# Patient Record
Sex: FEMALE | Race: WHITE | Employment: UNEMPLOYED | URBAN - METROPOLITAN AREA
[De-identification: names, ages, dates, MRNs, and addresses within clinical notes are randomized per-mention and may not be internally consistent; named-entity substitution may affect disease eponyms.]

---

## 2017-01-06 ENCOUNTER — TELEPHONE (OUTPATIENT)
Dept: PHARMACY | Facility: CLINIC | Age: 72
End: 2017-01-06

## 2017-03-08 PROBLEM — K52.9 COLITIS: Status: ACTIVE | Noted: 2017-03-08

## 2017-03-08 PROBLEM — M47.817 LUMBOSACRAL SPONDYLOSIS: Status: ACTIVE | Noted: 2017-03-08

## 2017-03-14 PROBLEM — M70.71 BURSITIS OF RIGHT HIP: Status: ACTIVE | Noted: 2017-03-14

## 2017-03-14 PROBLEM — M17.12 PRIMARY OSTEOARTHRITIS OF LEFT KNEE: Status: ACTIVE | Noted: 2017-03-14

## 2017-05-05 PROBLEM — R41.3 MEMORY IMPAIRMENT: Status: ACTIVE | Noted: 2017-05-05

## 2017-12-11 PROBLEM — S22.000A CLOSED COMPRESSION FRACTURE OF THORACIC VERTEBRA (HCC): Status: ACTIVE | Noted: 2017-01-01

## 2017-12-11 PROBLEM — M54.9 INTRACTABLE BACK PAIN: Status: ACTIVE | Noted: 2017-01-01

## 2017-12-11 PROBLEM — S32.040A CLOSED COMPRESSION FRACTURE OF FOURTH LUMBAR VERTEBRA (HCC): Status: ACTIVE | Noted: 2017-01-01

## 2017-12-11 PROBLEM — M54.50 ACUTE MIDLINE LOW BACK PAIN WITHOUT SCIATICA: Status: ACTIVE | Noted: 2017-01-01

## 2018-01-01 ENCOUNTER — CARE COORDINATION (OUTPATIENT)
Dept: CARE COORDINATION | Age: 73
End: 2018-01-01

## 2018-01-01 ENCOUNTER — APPOINTMENT (OUTPATIENT)
Dept: GENERAL RADIOLOGY | Age: 73
DRG: 698 | End: 2018-01-01
Payer: MEDICARE

## 2018-01-01 ENCOUNTER — APPOINTMENT (OUTPATIENT)
Dept: GENERAL RADIOLOGY | Age: 73
DRG: 871 | End: 2018-01-01
Payer: MEDICARE

## 2018-01-01 ENCOUNTER — TELEPHONE (OUTPATIENT)
Dept: PHARMACY | Facility: CLINIC | Age: 73
End: 2018-01-01

## 2018-01-01 ENCOUNTER — HOSPITAL ENCOUNTER (INPATIENT)
Age: 73
LOS: 4 days | Discharge: NURSING, REHAB OR LONG-TERM CARE | DRG: 193 | End: 2018-07-09
Attending: EMERGENCY MEDICINE | Admitting: INTERNAL MEDICINE
Payer: MEDICARE

## 2018-01-01 ENCOUNTER — APPOINTMENT (OUTPATIENT)
Dept: CT IMAGING | Age: 73
DRG: 193 | End: 2018-01-01
Payer: MEDICARE

## 2018-01-01 ENCOUNTER — HOSPITAL ENCOUNTER (INPATIENT)
Age: 73
LOS: 3 days | Discharge: HOME | DRG: 698 | End: 2018-03-22
Attending: EMERGENCY MEDICINE | Admitting: INTERNAL MEDICINE
Payer: MEDICARE

## 2018-01-01 ENCOUNTER — HOSPITAL ENCOUNTER (INPATIENT)
Age: 73
LOS: 2 days | Discharge: HOME | DRG: 698 | End: 2018-04-12
Attending: EMERGENCY MEDICINE | Admitting: INTERNAL MEDICINE
Payer: MEDICARE

## 2018-01-01 ENCOUNTER — APPOINTMENT (OUTPATIENT)
Dept: CT IMAGING | Age: 73
DRG: 698 | End: 2018-01-01
Payer: MEDICARE

## 2018-01-01 ENCOUNTER — APPOINTMENT (OUTPATIENT)
Dept: ULTRASOUND IMAGING | Age: 73
DRG: 698 | End: 2018-01-01
Payer: MEDICARE

## 2018-01-01 ENCOUNTER — APPOINTMENT (OUTPATIENT)
Dept: GENERAL RADIOLOGY | Age: 73
DRG: 193 | End: 2018-01-01
Payer: MEDICARE

## 2018-01-01 ENCOUNTER — APPOINTMENT (OUTPATIENT)
Dept: CT IMAGING | Age: 73
DRG: 871 | End: 2018-01-01
Payer: MEDICARE

## 2018-01-01 ENCOUNTER — HOSPITAL ENCOUNTER (INPATIENT)
Age: 73
LOS: 1 days | DRG: 871 | End: 2018-08-18
Attending: EMERGENCY MEDICINE | Admitting: INTERNAL MEDICINE
Payer: MEDICARE

## 2018-01-01 ENCOUNTER — HOSPITAL ENCOUNTER (INPATIENT)
Age: 73
LOS: 3 days | Discharge: NURSING, REHAB OR LONG-TERM CARE | DRG: 698 | End: 2018-07-26
Attending: EMERGENCY MEDICINE | Admitting: INTERNAL MEDICINE
Payer: MEDICARE

## 2018-01-01 VITALS
WEIGHT: 117 LBS | SYSTOLIC BLOOD PRESSURE: 158 MMHG | OXYGEN SATURATION: 96 % | HEART RATE: 64 BPM | BODY MASS INDEX: 24.56 KG/M2 | HEIGHT: 58 IN | DIASTOLIC BLOOD PRESSURE: 80 MMHG | TEMPERATURE: 98.1 F | RESPIRATION RATE: 18 BRPM

## 2018-01-01 VITALS
OXYGEN SATURATION: 96 % | WEIGHT: 132.3 LBS | HEART RATE: 68 BPM | TEMPERATURE: 98.2 F | RESPIRATION RATE: 18 BRPM | HEIGHT: 60 IN | SYSTOLIC BLOOD PRESSURE: 126 MMHG | BODY MASS INDEX: 25.97 KG/M2 | DIASTOLIC BLOOD PRESSURE: 84 MMHG

## 2018-01-01 VITALS
HEIGHT: 60 IN | HEART RATE: 70 BPM | WEIGHT: 125 LBS | BODY MASS INDEX: 24.54 KG/M2 | RESPIRATION RATE: 16 BRPM | OXYGEN SATURATION: 96 % | DIASTOLIC BLOOD PRESSURE: 72 MMHG | SYSTOLIC BLOOD PRESSURE: 138 MMHG | TEMPERATURE: 97.6 F

## 2018-01-01 VITALS
BODY MASS INDEX: 22.28 KG/M2 | RESPIRATION RATE: 18 BRPM | SYSTOLIC BLOOD PRESSURE: 106 MMHG | OXYGEN SATURATION: 94 % | TEMPERATURE: 98 F | DIASTOLIC BLOOD PRESSURE: 60 MMHG | HEIGHT: 60 IN | HEART RATE: 60 BPM | WEIGHT: 113.5 LBS

## 2018-01-01 VITALS
BODY MASS INDEX: 22.17 KG/M2 | DIASTOLIC BLOOD PRESSURE: 58 MMHG | TEMPERATURE: 96.6 F | SYSTOLIC BLOOD PRESSURE: 98 MMHG | WEIGHT: 113.54 LBS | OXYGEN SATURATION: 97 %

## 2018-01-01 DIAGNOSIS — G20 PARKINSON DISEASE (HCC): Primary | ICD-10-CM

## 2018-01-01 DIAGNOSIS — A41.9 SEPSIS, DUE TO UNSPECIFIED ORGANISM: ICD-10-CM

## 2018-01-01 DIAGNOSIS — N39.0 URINARY TRACT INFECTION WITH HEMATURIA, SITE UNSPECIFIED: ICD-10-CM

## 2018-01-01 DIAGNOSIS — A41.9 SEPSIS, DUE TO UNSPECIFIED ORGANISM: Primary | ICD-10-CM

## 2018-01-01 DIAGNOSIS — J96.90 RESPIRATORY FAILURE, UNSPECIFIED CHRONICITY, UNSPECIFIED WHETHER WITH HYPOXIA OR HYPERCAPNIA (HCC): ICD-10-CM

## 2018-01-01 DIAGNOSIS — R41.82 ALTERED MENTAL STATUS, UNSPECIFIED ALTERED MENTAL STATUS TYPE: Primary | ICD-10-CM

## 2018-01-01 DIAGNOSIS — S22.000A CLOSED COMPRESSION FRACTURE OF THORACIC VERTEBRA, INITIAL ENCOUNTER (HCC): Primary | ICD-10-CM

## 2018-01-01 DIAGNOSIS — M15.9 GENERALIZED OSTEOARTHRITIS: ICD-10-CM

## 2018-01-01 DIAGNOSIS — R31.9 URINARY TRACT INFECTION WITH HEMATURIA, SITE UNSPECIFIED: ICD-10-CM

## 2018-01-01 DIAGNOSIS — E87.20 LACTIC ACIDOSIS: ICD-10-CM

## 2018-01-01 DIAGNOSIS — N39.0 URINARY TRACT INFECTION ASSOCIATED WITH INDWELLING URETHRAL CATHETER, INITIAL ENCOUNTER (HCC): ICD-10-CM

## 2018-01-01 DIAGNOSIS — R19.8 PERFORATED VISCUS: ICD-10-CM

## 2018-01-01 DIAGNOSIS — R06.03 RESPIRATORY DISTRESS: Primary | ICD-10-CM

## 2018-01-01 DIAGNOSIS — T83.511A URINARY TRACT INFECTION ASSOCIATED WITH INDWELLING URETHRAL CATHETER, INITIAL ENCOUNTER (HCC): ICD-10-CM

## 2018-01-01 LAB
AADO2: 343.9 MMHG
ALBUMIN SERPL-MCNC: 2.9 G/DL (ref 3.5–5.2)
ALBUMIN SERPL-MCNC: 3.1 G/DL (ref 3.5–5.2)
ALBUMIN SERPL-MCNC: 3.1 G/DL (ref 3.5–5.2)
ALBUMIN SERPL-MCNC: 3.2 G/DL (ref 3.5–5.2)
ALBUMIN SERPL-MCNC: 3.2 G/DL (ref 3.5–5.2)
ALBUMIN SERPL-MCNC: 3.3 G/DL (ref 3.5–5.2)
ALBUMIN SERPL-MCNC: 3.4 G/DL (ref 3.5–5.2)
ALBUMIN SERPL-MCNC: 3.5 G/DL (ref 3.5–5.2)
ALBUMIN SERPL-MCNC: 4 G/DL (ref 3.5–5.2)
ALP BLD-CCNC: 102 U/L (ref 35–104)
ALP BLD-CCNC: 103 U/L (ref 35–104)
ALP BLD-CCNC: 134 U/L (ref 35–104)
ALP BLD-CCNC: 141 U/L (ref 35–104)
ALP BLD-CCNC: 143 U/L (ref 35–104)
ALP BLD-CCNC: 144 U/L (ref 35–104)
ALP BLD-CCNC: 145 U/L (ref 35–104)
ALP BLD-CCNC: 65 U/L (ref 35–104)
ALP BLD-CCNC: 73 U/L (ref 35–104)
ALP BLD-CCNC: 77 U/L (ref 35–104)
ALP BLD-CCNC: 79 U/L (ref 35–104)
ALP BLD-CCNC: 79 U/L (ref 35–104)
ALP BLD-CCNC: 81 U/L (ref 35–104)
ALP BLD-CCNC: 90 U/L (ref 35–104)
ALP BLD-CCNC: 93 U/L (ref 35–104)
ALP BLD-CCNC: 94 U/L (ref 35–104)
ALT SERPL-CCNC: 10 U/L (ref 0–32)
ALT SERPL-CCNC: 11 U/L (ref 0–32)
ALT SERPL-CCNC: 15 U/L (ref 0–32)
ALT SERPL-CCNC: 17 U/L (ref 0–32)
ALT SERPL-CCNC: 20 U/L (ref 0–32)
ALT SERPL-CCNC: 23 U/L (ref 0–32)
ALT SERPL-CCNC: 5 U/L (ref 0–32)
ALT SERPL-CCNC: 5 U/L (ref 0–32)
ALT SERPL-CCNC: 6 U/L (ref 0–32)
ALT SERPL-CCNC: 6 U/L (ref 0–32)
ALT SERPL-CCNC: 7 U/L (ref 0–32)
ALT SERPL-CCNC: 9 U/L (ref 0–32)
ALT SERPL-CCNC: <5 U/L (ref 0–32)
AMORPHOUS: ABNORMAL
ANION GAP SERPL CALCULATED.3IONS-SCNC: 10 MMOL/L (ref 7–16)
ANION GAP SERPL CALCULATED.3IONS-SCNC: 11 MMOL/L (ref 7–16)
ANION GAP SERPL CALCULATED.3IONS-SCNC: 13 MMOL/L (ref 7–16)
ANION GAP SERPL CALCULATED.3IONS-SCNC: 13 MMOL/L (ref 7–16)
ANION GAP SERPL CALCULATED.3IONS-SCNC: 14 MMOL/L (ref 7–16)
ANION GAP SERPL CALCULATED.3IONS-SCNC: 14 MMOL/L (ref 7–16)
ANION GAP SERPL CALCULATED.3IONS-SCNC: 15 MMOL/L (ref 7–16)
ANION GAP SERPL CALCULATED.3IONS-SCNC: 17 MMOL/L (ref 7–16)
ANION GAP SERPL CALCULATED.3IONS-SCNC: 17 MMOL/L (ref 7–16)
ANION GAP SERPL CALCULATED.3IONS-SCNC: 18 MMOL/L (ref 7–16)
ANION GAP SERPL CALCULATED.3IONS-SCNC: 20 MMOL/L (ref 7–16)
ANION GAP SERPL CALCULATED.3IONS-SCNC: 24 MMOL/L (ref 7–16)
ANION GAP SERPL CALCULATED.3IONS-SCNC: 25 MMOL/L (ref 7–16)
ANISOCYTOSIS: ABNORMAL
APTT: 27.7 SEC (ref 24.5–35.1)
AST SERPL-CCNC: 12 U/L (ref 0–31)
AST SERPL-CCNC: 15 U/L (ref 0–31)
AST SERPL-CCNC: 16 U/L (ref 0–31)
AST SERPL-CCNC: 16 U/L (ref 0–31)
AST SERPL-CCNC: 17 U/L (ref 0–31)
AST SERPL-CCNC: 18 U/L (ref 0–31)
AST SERPL-CCNC: 19 U/L (ref 0–31)
AST SERPL-CCNC: 20 U/L (ref 0–31)
AST SERPL-CCNC: 25 U/L (ref 0–31)
AST SERPL-CCNC: 29 U/L (ref 0–31)
AST SERPL-CCNC: 30 U/L (ref 0–31)
AST SERPL-CCNC: 30 U/L (ref 0–31)
AST SERPL-CCNC: 39 U/L (ref 0–31)
AST SERPL-CCNC: 41 U/L (ref 0–31)
AST SERPL-CCNC: 46 U/L (ref 0–31)
AST SERPL-CCNC: 69 U/L (ref 0–31)
B.E.: -11.9 MMOL/L (ref -3–3)
B.E.: -4.3 MMOL/L (ref -3–3)
BACTERIA: ABNORMAL /HPF
BASOPHILS ABSOLUTE: 0 E9/L (ref 0–0.2)
BASOPHILS ABSOLUTE: 0.02 E9/L (ref 0–0.2)
BASOPHILS ABSOLUTE: 0.03 E9/L (ref 0–0.2)
BASOPHILS ABSOLUTE: 0.04 E9/L (ref 0–0.2)
BASOPHILS ABSOLUTE: 0.04 E9/L (ref 0–0.2)
BASOPHILS ABSOLUTE: 0.05 E9/L (ref 0–0.2)
BASOPHILS ABSOLUTE: 0.05 E9/L (ref 0–0.2)
BASOPHILS ABSOLUTE: 0.06 E9/L (ref 0–0.2)
BASOPHILS ABSOLUTE: 0.09 E9/L (ref 0–0.2)
BASOPHILS RELATIVE PERCENT: 0.2 % (ref 0–2)
BASOPHILS RELATIVE PERCENT: 0.3 % (ref 0–2)
BASOPHILS RELATIVE PERCENT: 0.4 % (ref 0–2)
BASOPHILS RELATIVE PERCENT: 0.4 % (ref 0–2)
BASOPHILS RELATIVE PERCENT: 0.5 % (ref 0–2)
BASOPHILS RELATIVE PERCENT: 0.7 % (ref 0–2)
BILIRUB SERPL-MCNC: 0.2 MG/DL (ref 0–1.2)
BILIRUB SERPL-MCNC: 0.3 MG/DL (ref 0–1.2)
BILIRUB SERPL-MCNC: 0.4 MG/DL (ref 0–1.2)
BILIRUB SERPL-MCNC: 0.5 MG/DL (ref 0–1.2)
BILIRUB SERPL-MCNC: 0.6 MG/DL (ref 0–1.2)
BILIRUB SERPL-MCNC: 0.6 MG/DL (ref 0–1.2)
BILIRUB SERPL-MCNC: 0.9 MG/DL (ref 0–1.2)
BILIRUB SERPL-MCNC: 1.5 MG/DL (ref 0–1.2)
BILIRUBIN URINE: NEGATIVE
BLOOD CULTURE, ROUTINE: ABNORMAL
BLOOD CULTURE, ROUTINE: NORMAL
BLOOD, URINE: ABNORMAL
BUN BLDV-MCNC: 11 MG/DL (ref 8–23)
BUN BLDV-MCNC: 13 MG/DL (ref 8–23)
BUN BLDV-MCNC: 14 MG/DL (ref 8–23)
BUN BLDV-MCNC: 15 MG/DL (ref 8–23)
BUN BLDV-MCNC: 15 MG/DL (ref 8–23)
BUN BLDV-MCNC: 17 MG/DL (ref 8–23)
BUN BLDV-MCNC: 17 MG/DL (ref 8–23)
BUN BLDV-MCNC: 18 MG/DL (ref 8–23)
BUN BLDV-MCNC: 19 MG/DL (ref 8–23)
BUN BLDV-MCNC: 20 MG/DL (ref 8–23)
BUN BLDV-MCNC: 20 MG/DL (ref 8–23)
BUN BLDV-MCNC: 22 MG/DL (ref 8–23)
BUN BLDV-MCNC: 22 MG/DL (ref 8–23)
BUN BLDV-MCNC: 23 MG/DL (ref 8–23)
BUN BLDV-MCNC: 24 MG/DL (ref 8–23)
BUN BLDV-MCNC: 29 MG/DL (ref 8–23)
C-REACTIVE PROTEIN: 4.6 MG/DL (ref 0–0.4)
CALCIUM SERPL-MCNC: 10 MG/DL (ref 8.6–10.2)
CALCIUM SERPL-MCNC: 10.1 MG/DL (ref 8.6–10.2)
CALCIUM SERPL-MCNC: 10.3 MG/DL (ref 8.6–10.2)
CALCIUM SERPL-MCNC: 10.4 MG/DL (ref 8.6–10.2)
CALCIUM SERPL-MCNC: 10.8 MG/DL (ref 8.6–10.2)
CALCIUM SERPL-MCNC: 8.5 MG/DL (ref 8.6–10.2)
CALCIUM SERPL-MCNC: 8.9 MG/DL (ref 8.6–10.2)
CALCIUM SERPL-MCNC: 9.1 MG/DL (ref 8.6–10.2)
CALCIUM SERPL-MCNC: 9.1 MG/DL (ref 8.6–10.2)
CALCIUM SERPL-MCNC: 9.3 MG/DL (ref 8.6–10.2)
CALCIUM SERPL-MCNC: 9.4 MG/DL (ref 8.6–10.2)
CALCIUM SERPL-MCNC: 9.4 MG/DL (ref 8.6–10.2)
CALCIUM SERPL-MCNC: 9.5 MG/DL (ref 8.6–10.2)
CALCIUM SERPL-MCNC: 9.6 MG/DL (ref 8.6–10.2)
CALCIUM SERPL-MCNC: 9.7 MG/DL (ref 8.6–10.2)
CALCIUM SERPL-MCNC: 9.8 MG/DL (ref 8.6–10.2)
CHLORIDE BLD-SCNC: 101 MMOL/L (ref 98–107)
CHLORIDE BLD-SCNC: 102 MMOL/L (ref 98–107)
CHLORIDE BLD-SCNC: 104 MMOL/L (ref 98–107)
CHLORIDE BLD-SCNC: 105 MMOL/L (ref 98–107)
CHLORIDE BLD-SCNC: 106 MMOL/L (ref 98–107)
CHLORIDE BLD-SCNC: 107 MMOL/L (ref 98–107)
CHLORIDE BLD-SCNC: 107 MMOL/L (ref 98–107)
CHLORIDE BLD-SCNC: 93 MMOL/L (ref 98–107)
CHLORIDE BLD-SCNC: 99 MMOL/L (ref 98–107)
CLARITY: ABNORMAL
CLARITY: CLEAR
CLARITY: CLEAR
CO2: 17 MMOL/L (ref 22–29)
CO2: 18 MMOL/L (ref 22–29)
CO2: 18 MMOL/L (ref 22–29)
CO2: 19 MMOL/L (ref 22–29)
CO2: 19 MMOL/L (ref 22–29)
CO2: 21 MMOL/L (ref 22–29)
CO2: 22 MMOL/L (ref 22–29)
CO2: 22 MMOL/L (ref 22–29)
CO2: 23 MMOL/L (ref 22–29)
CO2: 25 MMOL/L (ref 22–29)
CO2: 26 MMOL/L (ref 22–29)
CO2: 26 MMOL/L (ref 22–29)
CO2: 27 MMOL/L (ref 22–29)
CO2: 27 MMOL/L (ref 22–29)
COHB: 0.3 % (ref 0–1.5)
COHB: 0.7 % (ref 0–1.5)
COLOR: ABNORMAL
COLOR: ABNORMAL
COLOR: YELLOW
CREAT SERPL-MCNC: 0.6 MG/DL (ref 0.5–1)
CREAT SERPL-MCNC: 0.7 MG/DL (ref 0.5–1)
CREAT SERPL-MCNC: 0.8 MG/DL (ref 0.5–1)
CRITICAL: ABNORMAL
CRITICAL: ABNORMAL
CRYSTALS, UA: ABNORMAL
CULTURE, BLOOD 2: NORMAL
D DIMER: 2420 NG/ML DDU
DATE ANALYZED: ABNORMAL
DATE ANALYZED: ABNORMAL
DATE OF COLLECTION: ABNORMAL
DATE OF COLLECTION: ABNORMAL
EKG ATRIAL RATE: 107 BPM
EKG ATRIAL RATE: 109 BPM
EKG ATRIAL RATE: 115 BPM
EKG ATRIAL RATE: 122 BPM
EKG ATRIAL RATE: 84 BPM
EKG P AXIS: -4 DEGREES
EKG P AXIS: 10 DEGREES
EKG P AXIS: 12 DEGREES
EKG P AXIS: 39 DEGREES
EKG P AXIS: 5 DEGREES
EKG P-R INTERVAL: 118 MS
EKG P-R INTERVAL: 124 MS
EKG P-R INTERVAL: 126 MS
EKG P-R INTERVAL: 136 MS
EKG P-R INTERVAL: 196 MS
EKG Q-T INTERVAL: 232 MS
EKG Q-T INTERVAL: 326 MS
EKG Q-T INTERVAL: 344 MS
EKG Q-T INTERVAL: 350 MS
EKG Q-T INTERVAL: 356 MS
EKG QRS DURATION: 72 MS
EKG QRS DURATION: 76 MS
EKG QRS DURATION: 82 MS
EKG QTC CALCULATION (BAZETT): 330 MS
EKG QTC CALCULATION (BAZETT): 420 MS
EKG QTC CALCULATION (BAZETT): 439 MS
EKG QTC CALCULATION (BAZETT): 459 MS
EKG QTC CALCULATION (BAZETT): 484 MS
EKG R AXIS: -2 DEGREES
EKG R AXIS: -8 DEGREES
EKG R AXIS: -9 DEGREES
EKG R AXIS: 5 DEGREES
EKG R AXIS: 9 DEGREES
EKG T AXIS: 106 DEGREES
EKG T AXIS: 108 DEGREES
EKG T AXIS: 132 DEGREES
EKG T AXIS: 139 DEGREES
EKG T AXIS: 44 DEGREES
EKG VENTRICULAR RATE: 107 BPM
EKG VENTRICULAR RATE: 109 BPM
EKG VENTRICULAR RATE: 115 BPM
EKG VENTRICULAR RATE: 122 BPM
EKG VENTRICULAR RATE: 84 BPM
EOSINOPHILS ABSOLUTE: 0 E9/L (ref 0.05–0.5)
EOSINOPHILS ABSOLUTE: 0 E9/L (ref 0.05–0.5)
EOSINOPHILS ABSOLUTE: 0.01 E9/L (ref 0.05–0.5)
EOSINOPHILS ABSOLUTE: 0.02 E9/L (ref 0.05–0.5)
EOSINOPHILS ABSOLUTE: 0.02 E9/L (ref 0.05–0.5)
EOSINOPHILS ABSOLUTE: 0.03 E9/L (ref 0.05–0.5)
EOSINOPHILS ABSOLUTE: 0.04 E9/L (ref 0.05–0.5)
EOSINOPHILS ABSOLUTE: 0.07 E9/L (ref 0.05–0.5)
EOSINOPHILS ABSOLUTE: 0.08 E9/L (ref 0.05–0.5)
EOSINOPHILS ABSOLUTE: 0.08 E9/L (ref 0.05–0.5)
EOSINOPHILS ABSOLUTE: 0.09 E9/L (ref 0.05–0.5)
EOSINOPHILS ABSOLUTE: 0.09 E9/L (ref 0.05–0.5)
EOSINOPHILS ABSOLUTE: 0.12 E9/L (ref 0.05–0.5)
EOSINOPHILS ABSOLUTE: 0.12 E9/L (ref 0.05–0.5)
EOSINOPHILS ABSOLUTE: 0.13 E9/L (ref 0.05–0.5)
EOSINOPHILS ABSOLUTE: 0.14 E9/L (ref 0.05–0.5)
EOSINOPHILS ABSOLUTE: 0.19 E9/L (ref 0.05–0.5)
EOSINOPHILS RELATIVE PERCENT: 0 % (ref 0–6)
EOSINOPHILS RELATIVE PERCENT: 0 % (ref 0–6)
EOSINOPHILS RELATIVE PERCENT: 0.1 % (ref 0–6)
EOSINOPHILS RELATIVE PERCENT: 0.2 % (ref 0–6)
EOSINOPHILS RELATIVE PERCENT: 0.4 % (ref 0–6)
EOSINOPHILS RELATIVE PERCENT: 0.8 % (ref 0–6)
EOSINOPHILS RELATIVE PERCENT: 0.8 % (ref 0–6)
EOSINOPHILS RELATIVE PERCENT: 0.9 % (ref 0–6)
EOSINOPHILS RELATIVE PERCENT: 1.2 % (ref 0–6)
EOSINOPHILS RELATIVE PERCENT: 1.2 % (ref 0–6)
EOSINOPHILS RELATIVE PERCENT: 1.6 % (ref 0–6)
EOSINOPHILS RELATIVE PERCENT: 1.7 % (ref 0–6)
EPITHELIAL CELLS, UA: ABNORMAL /HPF
EPITHELIAL CELLS, UA: ABNORMAL /HPF
FILM ARRAY ADENOVIRUS: ABNORMAL
FILM ARRAY BORDETELLA PERTUSSIS: ABNORMAL
FILM ARRAY CHLAMYDOPHILIA PNEUMONIAE: ABNORMAL
FILM ARRAY CORONAVIRUS 229E: ABNORMAL
FILM ARRAY CORONAVIRUS HKU1: ABNORMAL
FILM ARRAY CORONAVIRUS NL63: ABNORMAL
FILM ARRAY CORONAVIRUS OC43: ABNORMAL
FILM ARRAY INFLUENZA A VIRUS 09H1: ABNORMAL
FILM ARRAY INFLUENZA A VIRUS H1: ABNORMAL
FILM ARRAY INFLUENZA A VIRUS H3: ABNORMAL
FILM ARRAY INFLUENZA A VIRUS: ABNORMAL
FILM ARRAY INFLUENZA B: ABNORMAL
FILM ARRAY METAPNEUMOVIRUS: ABNORMAL
FILM ARRAY MYCOPLASMA PNEUMONIAE: ABNORMAL
FILM ARRAY PARAINFLUENZA VIRUS 1: ABNORMAL
FILM ARRAY PARAINFLUENZA VIRUS 2: ABNORMAL
FILM ARRAY PARAINFLUENZA VIRUS 3: ABNORMAL
FILM ARRAY PARAINFLUENZA VIRUS 4: ABNORMAL
FILM ARRAY RESPIRATORY SYNCITIAL VIRUS: ABNORMAL
FIO2: 100 %
GFR AFRICAN AMERICAN: 33
GFR AFRICAN AMERICAN: >60
GFR NON-AFRICAN AMERICAN: 27 ML/MIN/1.73
GFR NON-AFRICAN AMERICAN: >60 ML/MIN/1.73
GLUCOSE BLD-MCNC: 102 MG/DL (ref 74–109)
GLUCOSE BLD-MCNC: 113 MG/DL (ref 74–109)
GLUCOSE BLD-MCNC: 120 MG/DL (ref 74–109)
GLUCOSE BLD-MCNC: 129 MG/DL (ref 74–109)
GLUCOSE BLD-MCNC: 136 MG/DL (ref 74–109)
GLUCOSE BLD-MCNC: 138 MG/DL (ref 74–109)
GLUCOSE BLD-MCNC: 163 MG/DL (ref 74–109)
GLUCOSE BLD-MCNC: 77 MG/DL (ref 74–109)
GLUCOSE BLD-MCNC: 79 MG/DL (ref 74–109)
GLUCOSE BLD-MCNC: 82 MG/DL (ref 74–109)
GLUCOSE BLD-MCNC: 83 MG/DL (ref 74–109)
GLUCOSE BLD-MCNC: 91 MG/DL (ref 74–109)
GLUCOSE BLD-MCNC: 93 MG/DL (ref 74–109)
GLUCOSE BLD-MCNC: 94 MG/DL (ref 74–109)
GLUCOSE BLD-MCNC: 95 MG/DL (ref 74–109)
GLUCOSE BLD-MCNC: 98 MG/DL (ref 74–109)
GLUCOSE BLD-MCNC: 98 MG/DL (ref 74–109)
GLUCOSE URINE: NEGATIVE MG/DL
HCO3: 12 MMOL/L (ref 22–26)
HCO3: 18.9 MMOL/L (ref 22–26)
HCT VFR BLD CALC: 34.2 % (ref 34–48)
HCT VFR BLD CALC: 34.7 % (ref 34–48)
HCT VFR BLD CALC: 34.8 % (ref 34–48)
HCT VFR BLD CALC: 35.8 % (ref 34–48)
HCT VFR BLD CALC: 36.2 % (ref 34–48)
HCT VFR BLD CALC: 36.2 % (ref 34–48)
HCT VFR BLD CALC: 36.7 % (ref 34–48)
HCT VFR BLD CALC: 36.7 % (ref 34–48)
HCT VFR BLD CALC: 37.5 % (ref 34–48)
HCT VFR BLD CALC: 37.9 % (ref 34–48)
HCT VFR BLD CALC: 38.2 % (ref 34–48)
HCT VFR BLD CALC: 38.6 % (ref 34–48)
HCT VFR BLD CALC: 39.3 % (ref 34–48)
HCT VFR BLD CALC: 39.4 % (ref 34–48)
HCT VFR BLD CALC: 40.8 % (ref 34–48)
HCT VFR BLD CALC: 41.5 % (ref 34–48)
HCT VFR BLD CALC: 42.4 % (ref 34–48)
HCT VFR BLD CALC: 45.7 % (ref 34–48)
HEMOGLOBIN: 10.4 G/DL (ref 11.5–15.5)
HEMOGLOBIN: 10.8 G/DL (ref 11.5–15.5)
HEMOGLOBIN: 10.8 G/DL (ref 11.5–15.5)
HEMOGLOBIN: 11.1 G/DL (ref 11.5–15.5)
HEMOGLOBIN: 11.2 G/DL (ref 11.5–15.5)
HEMOGLOBIN: 11.4 G/DL (ref 11.5–15.5)
HEMOGLOBIN: 11.5 G/DL (ref 11.5–15.5)
HEMOGLOBIN: 11.6 G/DL (ref 11.5–15.5)
HEMOGLOBIN: 11.6 G/DL (ref 11.5–15.5)
HEMOGLOBIN: 11.9 G/DL (ref 11.5–15.5)
HEMOGLOBIN: 12 G/DL (ref 11.5–15.5)
HEMOGLOBIN: 12.3 G/DL (ref 11.5–15.5)
HEMOGLOBIN: 12.6 G/DL (ref 11.5–15.5)
HEMOGLOBIN: 12.6 G/DL (ref 11.5–15.5)
HEMOGLOBIN: 12.7 G/DL (ref 11.5–15.5)
HEMOGLOBIN: 13.4 G/DL (ref 11.5–15.5)
HEMOGLOBIN: 13.4 G/DL (ref 11.5–15.5)
HEMOGLOBIN: 14.3 G/DL (ref 11.5–15.5)
HHB: 0.2 % (ref 0–5)
HHB: 1.2 % (ref 0–5)
IMMATURE GRANULOCYTES #: 0.05 E9/L
IMMATURE GRANULOCYTES #: 0.07 E9/L
IMMATURE GRANULOCYTES #: 0.08 E9/L
IMMATURE GRANULOCYTES #: 0.1 E9/L
IMMATURE GRANULOCYTES #: 0.12 E9/L
IMMATURE GRANULOCYTES #: 0.15 E9/L
IMMATURE GRANULOCYTES #: 0.16 E9/L
IMMATURE GRANULOCYTES #: 0.19 E9/L
IMMATURE GRANULOCYTES #: 0.22 E9/L
IMMATURE GRANULOCYTES #: 0.23 E9/L
IMMATURE GRANULOCYTES %: 0.5 % (ref 0–5)
IMMATURE GRANULOCYTES %: 0.6 % (ref 0–5)
IMMATURE GRANULOCYTES %: 0.6 % (ref 0–5)
IMMATURE GRANULOCYTES %: 0.7 % (ref 0–5)
IMMATURE GRANULOCYTES %: 0.7 % (ref 0–5)
IMMATURE GRANULOCYTES %: 0.8 % (ref 0–5)
IMMATURE GRANULOCYTES %: 0.8 % (ref 0–5)
IMMATURE GRANULOCYTES %: 0.9 % (ref 0–5)
IMMATURE GRANULOCYTES %: 1 % (ref 0–5)
IMMATURE GRANULOCYTES %: 1.3 % (ref 0–5)
IMMATURE GRANULOCYTES %: 1.3 % (ref 0–5)
IMMATURE GRANULOCYTES %: 1.4 % (ref 0–5)
IMMATURE GRANULOCYTES %: 1.4 % (ref 0–5)
IMMATURE GRANULOCYTES %: 1.5 % (ref 0–5)
IMMATURE GRANULOCYTES %: 1.8 % (ref 0–5)
IMMATURE GRANULOCYTES %: 1.8 % (ref 0–5)
INR BLD: 1.1
KETONES, URINE: NEGATIVE MG/DL
LAB: ABNORMAL
LAB: ABNORMAL
LACTIC ACID: 1.1 MMOL/L (ref 0.5–2.2)
LACTIC ACID: 1.4 MMOL/L (ref 0.5–2.2)
LACTIC ACID: 1.6 MMOL/L (ref 0.5–2.2)
LACTIC ACID: 1.6 MMOL/L (ref 0.5–2.2)
LACTIC ACID: 1.8 MMOL/L (ref 0.5–2.2)
LACTIC ACID: 1.8 MMOL/L (ref 0.5–2.2)
LACTIC ACID: 1.9 MMOL/L (ref 0.5–2.2)
LACTIC ACID: 10.2 MMOL/L (ref 0.5–2.2)
LACTIC ACID: 12.4 MMOL/L (ref 0.5–2.2)
LACTIC ACID: 2.1 MMOL/L (ref 0.5–2.2)
LACTIC ACID: 2.4 MMOL/L (ref 0.5–2.2)
LACTIC ACID: 3.3 MMOL/L (ref 0.5–2.2)
LACTIC ACID: 4.9 MMOL/L (ref 0.5–2.2)
LACTIC ACID: 6.7 MMOL/L (ref 0.5–2.2)
LEFT VENTRICULAR EJECTION FRACTION HIGH VALUE: 70 %
LEFT VENTRICULAR EJECTION FRACTION MODE: NORMAL
LEUKOCYTE ESTERASE, URINE: ABNORMAL
LIPASE: 41 U/L (ref 13–60)
LV EF: 65 %
LV EF: 68 %
LVEF MODALITY: NORMAL
LYMPHOCYTES ABSOLUTE: 0.95 E9/L (ref 1.5–4)
LYMPHOCYTES ABSOLUTE: 1.47 E9/L (ref 1.5–4)
LYMPHOCYTES ABSOLUTE: 1.59 E9/L (ref 1.5–4)
LYMPHOCYTES ABSOLUTE: 1.85 E9/L (ref 1.5–4)
LYMPHOCYTES ABSOLUTE: 1.86 E9/L (ref 1.5–4)
LYMPHOCYTES ABSOLUTE: 1.88 E9/L (ref 1.5–4)
LYMPHOCYTES ABSOLUTE: 1.88 E9/L (ref 1.5–4)
LYMPHOCYTES ABSOLUTE: 1.89 E9/L (ref 1.5–4)
LYMPHOCYTES ABSOLUTE: 1.96 E9/L (ref 1.5–4)
LYMPHOCYTES ABSOLUTE: 2.15 E9/L (ref 1.5–4)
LYMPHOCYTES ABSOLUTE: 2.16 E9/L (ref 1.5–4)
LYMPHOCYTES ABSOLUTE: 2.36 E9/L (ref 1.5–4)
LYMPHOCYTES ABSOLUTE: 2.37 E9/L (ref 1.5–4)
LYMPHOCYTES ABSOLUTE: 2.6 E9/L (ref 1.5–4)
LYMPHOCYTES ABSOLUTE: 2.68 E9/L (ref 1.5–4)
LYMPHOCYTES ABSOLUTE: 2.79 E9/L (ref 1.5–4)
LYMPHOCYTES ABSOLUTE: 2.98 E9/L (ref 1.5–4)
LYMPHOCYTES RELATIVE PERCENT: 14.2 % (ref 20–42)
LYMPHOCYTES RELATIVE PERCENT: 16.4 % (ref 20–42)
LYMPHOCYTES RELATIVE PERCENT: 16.7 % (ref 20–42)
LYMPHOCYTES RELATIVE PERCENT: 19.1 % (ref 20–42)
LYMPHOCYTES RELATIVE PERCENT: 19.8 % (ref 20–42)
LYMPHOCYTES RELATIVE PERCENT: 19.9 % (ref 20–42)
LYMPHOCYTES RELATIVE PERCENT: 19.9 % (ref 20–42)
LYMPHOCYTES RELATIVE PERCENT: 21.9 % (ref 20–42)
LYMPHOCYTES RELATIVE PERCENT: 22.2 % (ref 20–42)
LYMPHOCYTES RELATIVE PERCENT: 22.4 % (ref 20–42)
LYMPHOCYTES RELATIVE PERCENT: 23.5 % (ref 20–42)
LYMPHOCYTES RELATIVE PERCENT: 25.1 % (ref 20–42)
LYMPHOCYTES RELATIVE PERCENT: 26.1 % (ref 20–42)
LYMPHOCYTES RELATIVE PERCENT: 28.3 % (ref 20–42)
LYMPHOCYTES RELATIVE PERCENT: 32.1 % (ref 20–42)
LYMPHOCYTES RELATIVE PERCENT: 5.3 % (ref 20–42)
LYMPHOCYTES RELATIVE PERCENT: 7.7 % (ref 20–42)
Lab: 1351
Lab: 155
MAGNESIUM: 1.7 MG/DL (ref 1.6–2.6)
MCH RBC QN AUTO: 27.4 PG (ref 26–35)
MCH RBC QN AUTO: 27.6 PG (ref 26–35)
MCH RBC QN AUTO: 28 PG (ref 26–35)
MCH RBC QN AUTO: 28.1 PG (ref 26–35)
MCH RBC QN AUTO: 28.3 PG (ref 26–35)
MCH RBC QN AUTO: 28.4 PG (ref 26–35)
MCH RBC QN AUTO: 28.4 PG (ref 26–35)
MCH RBC QN AUTO: 28.5 PG (ref 26–35)
MCH RBC QN AUTO: 28.6 PG (ref 26–35)
MCH RBC QN AUTO: 28.7 PG (ref 26–35)
MCH RBC QN AUTO: 28.8 PG (ref 26–35)
MCH RBC QN AUTO: 28.9 PG (ref 26–35)
MCH RBC QN AUTO: 29 PG (ref 26–35)
MCH RBC QN AUTO: 29.1 PG (ref 26–35)
MCH RBC QN AUTO: 29.1 PG (ref 26–35)
MCH RBC QN AUTO: 29.6 PG (ref 26–35)
MCHC RBC AUTO-ENTMCNC: 30.2 % (ref 32–34.5)
MCHC RBC AUTO-ENTMCNC: 30.4 % (ref 32–34.5)
MCHC RBC AUTO-ENTMCNC: 30.7 % (ref 32–34.5)
MCHC RBC AUTO-ENTMCNC: 30.9 % (ref 32–34.5)
MCHC RBC AUTO-ENTMCNC: 31 % (ref 32–34.5)
MCHC RBC AUTO-ENTMCNC: 31.1 % (ref 32–34.5)
MCHC RBC AUTO-ENTMCNC: 31.2 % (ref 32–34.5)
MCHC RBC AUTO-ENTMCNC: 31.3 % (ref 32–34.5)
MCHC RBC AUTO-ENTMCNC: 31.6 % (ref 32–34.5)
MCHC RBC AUTO-ENTMCNC: 31.7 % (ref 32–34.5)
MCHC RBC AUTO-ENTMCNC: 32.2 % (ref 32–34.5)
MCHC RBC AUTO-ENTMCNC: 32.3 % (ref 32–34.5)
MCHC RBC AUTO-ENTMCNC: 32.6 % (ref 32–34.5)
MCHC RBC AUTO-ENTMCNC: 33.4 % (ref 32–34.5)
MCV RBC AUTO: 87 FL (ref 80–99.9)
MCV RBC AUTO: 87.1 FL (ref 80–99.9)
MCV RBC AUTO: 88.3 FL (ref 80–99.9)
MCV RBC AUTO: 89.1 FL (ref 80–99.9)
MCV RBC AUTO: 89.3 FL (ref 80–99.9)
MCV RBC AUTO: 90 FL (ref 80–99.9)
MCV RBC AUTO: 90.1 FL (ref 80–99.9)
MCV RBC AUTO: 90.2 FL (ref 80–99.9)
MCV RBC AUTO: 90.5 FL (ref 80–99.9)
MCV RBC AUTO: 90.5 FL (ref 80–99.9)
MCV RBC AUTO: 90.9 FL (ref 80–99.9)
MCV RBC AUTO: 92.4 FL (ref 80–99.9)
MCV RBC AUTO: 92.6 FL (ref 80–99.9)
MCV RBC AUTO: 92.7 FL (ref 80–99.9)
MCV RBC AUTO: 92.7 FL (ref 80–99.9)
MCV RBC AUTO: 93 FL (ref 80–99.9)
MCV RBC AUTO: 94.7 FL (ref 80–99.9)
MCV RBC AUTO: 95.8 FL (ref 80–99.9)
METAMYELOCYTES RELATIVE PERCENT: 0.9 % (ref 0–1)
METER GLUCOSE: 130 MG/DL (ref 70–110)
METER GLUCOSE: 216 MG/DL (ref 70–110)
METHB: 0.1 % (ref 0–1.5)
METHB: 0.2 % (ref 0–1.5)
MODE: ABNORMAL
MODE: ABNORMAL
MONOCYTES ABSOLUTE: 0.21 E9/L (ref 0.1–0.95)
MONOCYTES ABSOLUTE: 0.23 E9/L (ref 0.1–0.95)
MONOCYTES ABSOLUTE: 0.46 E9/L (ref 0.1–0.95)
MONOCYTES ABSOLUTE: 0.66 E9/L (ref 0.1–0.95)
MONOCYTES ABSOLUTE: 0.68 E9/L (ref 0.1–0.95)
MONOCYTES ABSOLUTE: 0.7 E9/L (ref 0.1–0.95)
MONOCYTES ABSOLUTE: 0.7 E9/L (ref 0.1–0.95)
MONOCYTES ABSOLUTE: 0.79 E9/L (ref 0.1–0.95)
MONOCYTES ABSOLUTE: 0.81 E9/L (ref 0.1–0.95)
MONOCYTES ABSOLUTE: 0.84 E9/L (ref 0.1–0.95)
MONOCYTES ABSOLUTE: 0.87 E9/L (ref 0.1–0.95)
MONOCYTES ABSOLUTE: 0.91 E9/L (ref 0.1–0.95)
MONOCYTES ABSOLUTE: 0.93 E9/L (ref 0.1–0.95)
MONOCYTES ABSOLUTE: 1.03 E9/L (ref 0.1–0.95)
MONOCYTES ABSOLUTE: 1.06 E9/L (ref 0.1–0.95)
MONOCYTES ABSOLUTE: 1.26 E9/L (ref 0.1–0.95)
MONOCYTES ABSOLUTE: 1.47 E9/L (ref 0.1–0.95)
MONOCYTES RELATIVE PERCENT: 1.6 % (ref 2–12)
MONOCYTES RELATIVE PERCENT: 1.9 % (ref 2–12)
MONOCYTES RELATIVE PERCENT: 10.6 % (ref 2–12)
MONOCYTES RELATIVE PERCENT: 10.9 % (ref 2–12)
MONOCYTES RELATIVE PERCENT: 11.4 % (ref 2–12)
MONOCYTES RELATIVE PERCENT: 11.5 % (ref 2–12)
MONOCYTES RELATIVE PERCENT: 4.8 % (ref 2–12)
MONOCYTES RELATIVE PERCENT: 5.3 % (ref 2–12)
MONOCYTES RELATIVE PERCENT: 5.8 % (ref 2–12)
MONOCYTES RELATIVE PERCENT: 6 % (ref 2–12)
MONOCYTES RELATIVE PERCENT: 6.8 % (ref 2–12)
MONOCYTES RELATIVE PERCENT: 7.8 % (ref 2–12)
MONOCYTES RELATIVE PERCENT: 7.9 % (ref 2–12)
MONOCYTES RELATIVE PERCENT: 8.1 % (ref 2–12)
MONOCYTES RELATIVE PERCENT: 8.3 % (ref 2–12)
MONOCYTES RELATIVE PERCENT: 8.7 % (ref 2–12)
MONOCYTES RELATIVE PERCENT: 9.7 % (ref 2–12)
NEUTROPHILS ABSOLUTE: 10.93 E9/L (ref 1.8–7.3)
NEUTROPHILS ABSOLUTE: 11 E9/L (ref 1.8–7.3)
NEUTROPHILS ABSOLUTE: 26.17 E9/L (ref 1.8–7.3)
NEUTROPHILS ABSOLUTE: 4.35 E9/L (ref 1.8–7.3)
NEUTROPHILS ABSOLUTE: 5.06 E9/L (ref 1.8–7.3)
NEUTROPHILS ABSOLUTE: 5.23 E9/L (ref 1.8–7.3)
NEUTROPHILS ABSOLUTE: 5.87 E9/L (ref 1.8–7.3)
NEUTROPHILS ABSOLUTE: 6.19 E9/L (ref 1.8–7.3)
NEUTROPHILS ABSOLUTE: 6.29 E9/L (ref 1.8–7.3)
NEUTROPHILS ABSOLUTE: 6.69 E9/L (ref 1.8–7.3)
NEUTROPHILS ABSOLUTE: 6.82 E9/L (ref 1.8–7.3)
NEUTROPHILS ABSOLUTE: 7.07 E9/L (ref 1.8–7.3)
NEUTROPHILS ABSOLUTE: 7.33 E9/L (ref 1.8–7.3)
NEUTROPHILS ABSOLUTE: 8.04 E9/L (ref 1.8–7.3)
NEUTROPHILS ABSOLUTE: 8.2 E9/L (ref 1.8–7.3)
NEUTROPHILS ABSOLUTE: 8.41 E9/L (ref 1.8–7.3)
NEUTROPHILS ABSOLUTE: 9.05 E9/L (ref 1.8–7.3)
NEUTROPHILS RELATIVE PERCENT: 56.5 % (ref 43–80)
NEUTROPHILS RELATIVE PERCENT: 57.3 % (ref 43–80)
NEUTROPHILS RELATIVE PERCENT: 62.2 % (ref 43–80)
NEUTROPHILS RELATIVE PERCENT: 63.3 % (ref 43–80)
NEUTROPHILS RELATIVE PERCENT: 63.8 % (ref 43–80)
NEUTROPHILS RELATIVE PERCENT: 65.4 % (ref 43–80)
NEUTROPHILS RELATIVE PERCENT: 67.7 % (ref 43–80)
NEUTROPHILS RELATIVE PERCENT: 67.9 % (ref 43–80)
NEUTROPHILS RELATIVE PERCENT: 68.9 % (ref 43–80)
NEUTROPHILS RELATIVE PERCENT: 69.1 % (ref 43–80)
NEUTROPHILS RELATIVE PERCENT: 70.6 % (ref 43–80)
NEUTROPHILS RELATIVE PERCENT: 72.4 % (ref 43–80)
NEUTROPHILS RELATIVE PERCENT: 74.8 % (ref 43–80)
NEUTROPHILS RELATIVE PERCENT: 76.9 % (ref 43–80)
NEUTROPHILS RELATIVE PERCENT: 82.9 % (ref 43–80)
NEUTROPHILS RELATIVE PERCENT: 88.5 % (ref 43–80)
NEUTROPHILS RELATIVE PERCENT: 88.7 % (ref 43–80)
NITRITE, URINE: NEGATIVE
NITRITE, URINE: POSITIVE
NUCLEATED RED BLOOD CELLS: 0.9 /100 WBC
O2 CONTENT: 19.5 ML/DL
O2 SATURATION: 98.8 % (ref 92–98.5)
O2 SATURATION: 99.8 % (ref 92–98.5)
O2HB: 98.3 % (ref 94–97)
O2HB: 99 % (ref 94–97)
OPERATOR ID: 1868
OPERATOR ID: ABNORMAL
ORGANISM: ABNORMAL
PATIENT TEMP: 37 C
PATIENT TEMP: 37 C
PCO2: 23.2 MMHG (ref 35–45)
PCO2: 29.5 MMHG (ref 35–45)
PDW BLD-RTO: 14.6 FL (ref 11.5–15)
PDW BLD-RTO: 14.6 FL (ref 11.5–15)
PDW BLD-RTO: 14.8 FL (ref 11.5–15)
PDW BLD-RTO: 14.9 FL (ref 11.5–15)
PDW BLD-RTO: 15.1 FL (ref 11.5–15)
PDW BLD-RTO: 15.5 FL (ref 11.5–15)
PDW BLD-RTO: 15.6 FL (ref 11.5–15)
PDW BLD-RTO: 15.8 FL (ref 11.5–15)
PDW BLD-RTO: 16.4 FL (ref 11.5–15)
PDW BLD-RTO: 16.4 FL (ref 11.5–15)
PDW BLD-RTO: 16.5 FL (ref 11.5–15)
PDW BLD-RTO: 16.6 FL (ref 11.5–15)
PDW BLD-RTO: 16.7 FL (ref 11.5–15)
PDW BLD-RTO: 16.8 FL (ref 11.5–15)
PDW BLD-RTO: 16.8 FL (ref 11.5–15)
PEEP/CPAP: 5 CMH?O
PFO2: 3.21 MMHG/%
PH BLOOD GAS: 7.33 (ref 7.35–7.45)
PH BLOOD GAS: 7.42 (ref 7.35–7.45)
PH UA: 5.5 (ref 5–9)
PH UA: 5.5 (ref 5–9)
PH UA: 6 (ref 5–9)
PH UA: 8.5 (ref 5–9)
PH UA: 8.5 (ref 5–9)
PH UA: >=9 (ref 5–9)
PIP: 12 CMH?O
PLATELET # BLD: 207 E9/L (ref 130–450)
PLATELET # BLD: 223 E9/L (ref 130–450)
PLATELET # BLD: 225 E9/L (ref 130–450)
PLATELET # BLD: 251 E9/L (ref 130–450)
PLATELET # BLD: 256 E9/L (ref 130–450)
PLATELET # BLD: 265 E9/L (ref 130–450)
PLATELET # BLD: 268 E9/L (ref 130–450)
PLATELET # BLD: 278 E9/L (ref 130–450)
PLATELET # BLD: 282 E9/L (ref 130–450)
PLATELET # BLD: 298 E9/L (ref 130–450)
PLATELET # BLD: 298 E9/L (ref 130–450)
PLATELET # BLD: 308 E9/L (ref 130–450)
PLATELET # BLD: 315 E9/L (ref 130–450)
PLATELET # BLD: 316 E9/L (ref 130–450)
PLATELET # BLD: 317 E9/L (ref 130–450)
PLATELET # BLD: 335 E9/L (ref 130–450)
PLATELET # BLD: 366 E9/L (ref 130–450)
PLATELET # BLD: 377 E9/L (ref 130–450)
PMV BLD AUTO: 10.1 FL (ref 7–12)
PMV BLD AUTO: 10.4 FL (ref 7–12)
PMV BLD AUTO: 10.5 FL (ref 7–12)
PMV BLD AUTO: 10.5 FL (ref 7–12)
PMV BLD AUTO: 10.6 FL (ref 7–12)
PMV BLD AUTO: 10.6 FL (ref 7–12)
PMV BLD AUTO: 10.7 FL (ref 7–12)
PMV BLD AUTO: 10.7 FL (ref 7–12)
PMV BLD AUTO: 10.8 FL (ref 7–12)
PMV BLD AUTO: 10.9 FL (ref 7–12)
PMV BLD AUTO: 11 FL (ref 7–12)
PMV BLD AUTO: 11 FL (ref 7–12)
PMV BLD AUTO: 11.3 FL (ref 7–12)
PMV BLD AUTO: 11.7 FL (ref 7–12)
PMV BLD AUTO: 12.4 FL (ref 7–12)
PMV BLD AUTO: 9.9 FL (ref 7–12)
PO2: 157.9 MMHG (ref 60–100)
PO2: 320.6 MMHG (ref 60–100)
POC CHLORIDE: 101 MMOL/L (ref 100–108)
POC CREATININE: 1.8 MG/DL (ref 0.5–1)
POC POTASSIUM: 5.3 MMOL/L (ref 3.5–5)
POC SODIUM: 136 MMOL/L (ref 132–146)
POTASSIUM SERPL-SCNC: 3.6 MMOL/L (ref 3.5–5)
POTASSIUM SERPL-SCNC: 3.7 MMOL/L (ref 3.5–5)
POTASSIUM SERPL-SCNC: 3.7 MMOL/L (ref 3.5–5)
POTASSIUM SERPL-SCNC: 3.8 MMOL/L (ref 3.5–5)
POTASSIUM SERPL-SCNC: 3.9 MMOL/L (ref 3.5–5)
POTASSIUM SERPL-SCNC: 3.9 MMOL/L (ref 3.5–5)
POTASSIUM SERPL-SCNC: 4.1 MMOL/L (ref 3.5–5)
POTASSIUM SERPL-SCNC: 4.1 MMOL/L (ref 3.5–5)
POTASSIUM SERPL-SCNC: 4.2 MMOL/L (ref 3.5–5)
POTASSIUM SERPL-SCNC: 4.2 MMOL/L (ref 3.5–5)
POTASSIUM SERPL-SCNC: 4.3 MMOL/L (ref 3.5–5)
POTASSIUM SERPL-SCNC: 4.5 MMOL/L (ref 3.5–5)
POTASSIUM SERPL-SCNC: 4.5 MMOL/L (ref 3.5–5)
POTASSIUM SERPL-SCNC: 4.7 MMOL/L (ref 3.5–5)
POTASSIUM SERPL-SCNC: 5.2 MMOL/L (ref 3.5–5)
POTASSIUM SERPL-SCNC: 5.9 MMOL/L (ref 3.5–5)
POTASSIUM SERPL-SCNC: 6.4 MMOL/L (ref 3.5–5)
PRO-BNP: 1713 PG/ML (ref 0–450)
PRO-BNP: 6206 PG/ML (ref 0–450)
PROTEIN UA: 100 MG/DL
PROTEIN UA: 30 MG/DL
PROTEIN UA: NEGATIVE MG/DL
PROTHROMBIN TIME: 13.2 SEC (ref 9.3–12.4)
RBC # BLD: 3.57 E12/L (ref 3.5–5.5)
RBC # BLD: 3.74 E12/L (ref 3.5–5.5)
RBC # BLD: 3.91 E12/L (ref 3.5–5.5)
RBC # BLD: 3.94 E12/L (ref 3.5–5.5)
RBC # BLD: 3.96 E12/L (ref 3.5–5.5)
RBC # BLD: 3.97 E12/L (ref 3.5–5.5)
RBC # BLD: 3.99 E12/L (ref 3.5–5.5)
RBC # BLD: 4 E12/L (ref 3.5–5.5)
RBC # BLD: 4.15 E12/L (ref 3.5–5.5)
RBC # BLD: 4.2 E12/L (ref 3.5–5.5)
RBC # BLD: 4.21 E12/L (ref 3.5–5.5)
RBC # BLD: 4.24 E12/L (ref 3.5–5.5)
RBC # BLD: 4.4 E12/L (ref 3.5–5.5)
RBC # BLD: 4.43 E12/L (ref 3.5–5.5)
RBC # BLD: 4.46 E12/L (ref 3.5–5.5)
RBC # BLD: 4.66 E12/L (ref 3.5–5.5)
RBC # BLD: 4.7 E12/L (ref 3.5–5.5)
RBC # BLD: 5.05 E12/L (ref 3.5–5.5)
RBC UA: >20 /HPF (ref 0–2)
RBC UA: ABNORMAL /HPF (ref 0–2)
RENAL EPITHELIAL, UA: ABNORMAL /HPF
RHEUMATOID FACTOR: 13 IU/ML (ref 0–13)
RI(T): 107 %
SODIUM BLD-SCNC: 135 MMOL/L (ref 132–146)
SODIUM BLD-SCNC: 135 MMOL/L (ref 132–146)
SODIUM BLD-SCNC: 137 MMOL/L (ref 132–146)
SODIUM BLD-SCNC: 138 MMOL/L (ref 132–146)
SODIUM BLD-SCNC: 139 MMOL/L (ref 132–146)
SODIUM BLD-SCNC: 141 MMOL/L (ref 132–146)
SODIUM BLD-SCNC: 142 MMOL/L (ref 132–146)
SODIUM BLD-SCNC: 143 MMOL/L (ref 132–146)
SOURCE, BLOOD GAS: ABNORMAL
SOURCE, BLOOD GAS: ABNORMAL
SPECIFIC GRAVITY UA: 1.01 (ref 1–1.03)
SPECIFIC GRAVITY UA: 1.02 (ref 1–1.03)
SPECIFIC GRAVITY UA: <=1.005 (ref 1–1.03)
SPECIFIC GRAVITY UA: >=1.03 (ref 1–1.03)
THB: 13.6 G/DL (ref 11.5–16.5)
THB: 13.9 G/DL (ref 11.5–16.5)
TIME ANALYZED: 1356
TIME ANALYZED: 155
TOTAL CK: 42 U/L (ref 20–180)
TOTAL CK: 591 U/L (ref 20–180)
TOTAL PROTEIN: 5.4 G/DL (ref 6.4–8.3)
TOTAL PROTEIN: 5.4 G/DL (ref 6.4–8.3)
TOTAL PROTEIN: 5.6 G/DL (ref 6.4–8.3)
TOTAL PROTEIN: 5.6 G/DL (ref 6.4–8.3)
TOTAL PROTEIN: 5.7 G/DL (ref 6.4–8.3)
TOTAL PROTEIN: 5.8 G/DL (ref 6.4–8.3)
TOTAL PROTEIN: 5.8 G/DL (ref 6.4–8.3)
TOTAL PROTEIN: 5.9 G/DL (ref 6.4–8.3)
TOTAL PROTEIN: 6 G/DL (ref 6.4–8.3)
TOTAL PROTEIN: 6.1 G/DL (ref 6.4–8.3)
TOTAL PROTEIN: 6.1 G/DL (ref 6.4–8.3)
TOTAL PROTEIN: 6.2 G/DL (ref 6.4–8.3)
TOTAL PROTEIN: 6.3 G/DL (ref 6.4–8.3)
TOTAL PROTEIN: 6.6 G/DL (ref 6.4–8.3)
TOTAL PROTEIN: 6.8 G/DL (ref 6.4–8.3)
TOTAL PROTEIN: 7.2 G/DL (ref 6.4–8.3)
TROPONIN: 0.01 NG/ML (ref 0–0.03)
TROPONIN: 0.02 NG/ML (ref 0–0.03)
TROPONIN: 0.03 NG/ML (ref 0–0.03)
TROPONIN: 0.03 NG/ML (ref 0–0.03)
TROPONIN: 0.05 NG/ML (ref 0–0.03)
URINE CULTURE, ROUTINE: ABNORMAL
URINE CULTURE, ROUTINE: NORMAL
UROBILINOGEN, URINE: 0.2 E.U./DL
WBC # BLD: 10.7 E9/L (ref 4.5–11.5)
WBC # BLD: 11.3 E9/L (ref 4.5–11.5)
WBC # BLD: 11.9 E9/L (ref 4.5–11.5)
WBC # BLD: 12.1 E9/L (ref 4.5–11.5)
WBC # BLD: 12.3 E9/L (ref 4.5–11.5)
WBC # BLD: 13.1 E9/L (ref 4.5–11.5)
WBC # BLD: 13.3 E9/L (ref 4.5–11.5)
WBC # BLD: 18.8 E9/L (ref 4.5–11.5)
WBC # BLD: 29.4 E9/L (ref 4.5–11.5)
WBC # BLD: 7.6 E9/L (ref 4.5–11.5)
WBC # BLD: 8 E9/L (ref 4.5–11.5)
WBC # BLD: 9 E9/L (ref 4.5–11.5)
WBC # BLD: 9.3 E9/L (ref 4.5–11.5)
WBC # BLD: 9.5 E9/L (ref 4.5–11.5)
WBC # BLD: 9.6 E9/L (ref 4.5–11.5)
WBC # BLD: 9.8 E9/L (ref 4.5–11.5)
WBC UA: >20 /HPF (ref 0–5)
WBC UA: ABNORMAL /HPF (ref 0–5)
YEAST: ABNORMAL

## 2018-01-01 PROCEDURE — 1111F DSCHRG MED/CURRENT MED MERGE: CPT | Performed by: PHARMACIST

## 2018-01-01 PROCEDURE — 87088 URINE BACTERIA CULTURE: CPT

## 2018-01-01 PROCEDURE — 36415 COLL VENOUS BLD VENIPUNCTURE: CPT

## 2018-01-01 PROCEDURE — 94761 N-INVAS EAR/PLS OXIMETRY MLT: CPT

## 2018-01-01 PROCEDURE — 6360000002 HC RX W HCPCS: Performed by: INTERNAL MEDICINE

## 2018-01-01 PROCEDURE — 97162 PT EVAL MOD COMPLEX 30 MIN: CPT | Performed by: PHYSICAL THERAPIST

## 2018-01-01 PROCEDURE — 96376 TX/PRO/DX INJ SAME DRUG ADON: CPT

## 2018-01-01 PROCEDURE — 2060000000 HC ICU INTERMEDIATE R&B

## 2018-01-01 PROCEDURE — 70450 CT HEAD/BRAIN W/O DYE: CPT

## 2018-01-01 PROCEDURE — 83735 ASSAY OF MAGNESIUM: CPT

## 2018-01-01 PROCEDURE — 6360000002 HC RX W HCPCS: Performed by: EMERGENCY MEDICINE

## 2018-01-01 PROCEDURE — 2580000003 HC RX 258: Performed by: INTERNAL MEDICINE

## 2018-01-01 PROCEDURE — 97165 OT EVAL LOW COMPLEX 30 MIN: CPT

## 2018-01-01 PROCEDURE — 99232 SBSQ HOSP IP/OBS MODERATE 35: CPT | Performed by: INTERNAL MEDICINE

## 2018-01-01 PROCEDURE — 6370000000 HC RX 637 (ALT 250 FOR IP): Performed by: INTERNAL MEDICINE

## 2018-01-01 PROCEDURE — 80053 COMPREHEN METABOLIC PANEL: CPT

## 2018-01-01 PROCEDURE — 99222 1ST HOSP IP/OBS MODERATE 55: CPT | Performed by: CLINICAL NURSE SPECIALIST

## 2018-01-01 PROCEDURE — 97530 THERAPEUTIC ACTIVITIES: CPT

## 2018-01-01 PROCEDURE — 83605 ASSAY OF LACTIC ACID: CPT

## 2018-01-01 PROCEDURE — 96365 THER/PROPH/DIAG IV INF INIT: CPT

## 2018-01-01 PROCEDURE — 6370000000 HC RX 637 (ALT 250 FOR IP)

## 2018-01-01 PROCEDURE — 71045 X-RAY EXAM CHEST 1 VIEW: CPT

## 2018-01-01 PROCEDURE — 2580000003 HC RX 258

## 2018-01-01 PROCEDURE — 82947 ASSAY GLUCOSE BLOOD QUANT: CPT

## 2018-01-01 PROCEDURE — G0378 HOSPITAL OBSERVATION PER HR: HCPCS

## 2018-01-01 PROCEDURE — 97535 SELF CARE MNGMENT TRAINING: CPT

## 2018-01-01 PROCEDURE — 85025 COMPLETE CBC W/AUTO DIFF WBC: CPT

## 2018-01-01 PROCEDURE — G8978 MOBILITY CURRENT STATUS: HCPCS | Performed by: PHYSICAL THERAPIST

## 2018-01-01 PROCEDURE — 2580000003 HC RX 258: Performed by: EMERGENCY MEDICINE

## 2018-01-01 PROCEDURE — 81001 URINALYSIS AUTO W/SCOPE: CPT

## 2018-01-01 PROCEDURE — 86140 C-REACTIVE PROTEIN: CPT

## 2018-01-01 PROCEDURE — 94642 AEROSOL INHALATION TREATMENT: CPT

## 2018-01-01 PROCEDURE — 87040 BLOOD CULTURE FOR BACTERIA: CPT

## 2018-01-01 PROCEDURE — 99285 EMERGENCY DEPT VISIT HI MDM: CPT

## 2018-01-01 PROCEDURE — 2700000000 HC OXYGEN THERAPY PER DAY

## 2018-01-01 PROCEDURE — 87798 DETECT AGENT NOS DNA AMP: CPT

## 2018-01-01 PROCEDURE — 2500000003 HC RX 250 WO HCPCS: Performed by: INTERNAL MEDICINE

## 2018-01-01 PROCEDURE — 83880 ASSAY OF NATRIURETIC PEPTIDE: CPT

## 2018-01-01 PROCEDURE — 97530 THERAPEUTIC ACTIVITIES: CPT | Performed by: PHYSICAL THERAPIST

## 2018-01-01 PROCEDURE — 87502 INFLUENZA DNA AMP PROBE: CPT

## 2018-01-01 PROCEDURE — 2140000000 HC CCU INTERMEDIATE R&B

## 2018-01-01 PROCEDURE — 84295 ASSAY OF SERUM SODIUM: CPT

## 2018-01-01 PROCEDURE — 87581 M.PNEUMON DNA AMP PROBE: CPT

## 2018-01-01 PROCEDURE — 84484 ASSAY OF TROPONIN QUANT: CPT

## 2018-01-01 PROCEDURE — 94660 CPAP INITIATION&MGMT: CPT

## 2018-01-01 PROCEDURE — 99233 SBSQ HOSP IP/OBS HIGH 50: CPT | Performed by: INTERNAL MEDICINE

## 2018-01-01 PROCEDURE — 2580000003 HC RX 258: Performed by: STUDENT IN AN ORGANIZED HEALTH CARE EDUCATION/TRAINING PROGRAM

## 2018-01-01 PROCEDURE — 85610 PROTHROMBIN TIME: CPT

## 2018-01-01 PROCEDURE — 96372 THER/PROPH/DIAG INJ SC/IM: CPT

## 2018-01-01 PROCEDURE — 2500000003 HC RX 250 WO HCPCS: Performed by: EMERGENCY MEDICINE

## 2018-01-01 PROCEDURE — 76770 US EXAM ABDO BACK WALL COMP: CPT

## 2018-01-01 PROCEDURE — 31500 INSERT EMERGENCY AIRWAY: CPT

## 2018-01-01 PROCEDURE — 6360000004 HC RX CONTRAST MEDICATION: Performed by: RADIOLOGY

## 2018-01-01 PROCEDURE — 82962 GLUCOSE BLOOD TEST: CPT

## 2018-01-01 PROCEDURE — G8987 SELF CARE CURRENT STATUS: HCPCS

## 2018-01-01 PROCEDURE — 87077 CULTURE AEROBIC IDENTIFY: CPT

## 2018-01-01 PROCEDURE — 2580000003 HC RX 258: Performed by: NURSE PRACTITIONER

## 2018-01-01 PROCEDURE — 97166 OT EVAL MOD COMPLEX 45 MIN: CPT

## 2018-01-01 PROCEDURE — 82550 ASSAY OF CK (CPK): CPT

## 2018-01-01 PROCEDURE — 74018 RADEX ABDOMEN 1 VIEW: CPT

## 2018-01-01 PROCEDURE — 84132 ASSAY OF SERUM POTASSIUM: CPT

## 2018-01-01 PROCEDURE — G8979 MOBILITY GOAL STATUS: HCPCS

## 2018-01-01 PROCEDURE — 87186 SC STD MICRODIL/AGAR DIL: CPT

## 2018-01-01 PROCEDURE — G8988 SELF CARE GOAL STATUS: HCPCS

## 2018-01-01 PROCEDURE — C1751 CATH, INF, PER/CENT/MIDLINE: HCPCS

## 2018-01-01 PROCEDURE — 93005 ELECTROCARDIOGRAM TRACING: CPT | Performed by: STUDENT IN AN ORGANIZED HEALTH CARE EDUCATION/TRAINING PROGRAM

## 2018-01-01 PROCEDURE — 97161 PT EVAL LOW COMPLEX 20 MIN: CPT

## 2018-01-01 PROCEDURE — G8982 BODY POS GOAL STATUS: HCPCS | Performed by: PHYSICAL THERAPIST

## 2018-01-01 PROCEDURE — 97161 PT EVAL LOW COMPLEX 20 MIN: CPT | Performed by: PHYSICAL THERAPIST

## 2018-01-01 PROCEDURE — 86431 RHEUMATOID FACTOR QUANT: CPT

## 2018-01-01 PROCEDURE — 36556 INSERT NON-TUNNEL CV CATH: CPT

## 2018-01-01 PROCEDURE — 82805 BLOOD GASES W/O2 SATURATION: CPT

## 2018-01-01 PROCEDURE — 99222 1ST HOSP IP/OBS MODERATE 55: CPT | Performed by: INTERNAL MEDICINE

## 2018-01-01 PROCEDURE — 1111F DSCHRG MED/CURRENT MED MERGE: CPT

## 2018-01-01 PROCEDURE — 96375 TX/PRO/DX INJ NEW DRUG ADDON: CPT

## 2018-01-01 PROCEDURE — 99223 1ST HOSP IP/OBS HIGH 75: CPT | Performed by: INTERNAL MEDICINE

## 2018-01-01 PROCEDURE — 93005 ELECTROCARDIOGRAM TRACING: CPT | Performed by: NURSE PRACTITIONER

## 2018-01-01 PROCEDURE — 6370000000 HC RX 637 (ALT 250 FOR IP): Performed by: EMERGENCY MEDICINE

## 2018-01-01 PROCEDURE — 6360000002 HC RX W HCPCS

## 2018-01-01 PROCEDURE — 1200000000 HC SEMI PRIVATE

## 2018-01-01 PROCEDURE — 94640 AIRWAY INHALATION TREATMENT: CPT

## 2018-01-01 PROCEDURE — 82435 ASSAY OF BLOOD CHLORIDE: CPT

## 2018-01-01 PROCEDURE — 0BH18EZ INSERTION OF ENDOTRACHEAL AIRWAY INTO TRACHEA, VIA NATURAL OR ARTIFICIAL OPENING ENDOSCOPIC: ICD-10-PCS | Performed by: INTERNAL MEDICINE

## 2018-01-01 PROCEDURE — 87503 INFLUENZA DNA AMP PROB ADDL: CPT

## 2018-01-01 PROCEDURE — 2500000003 HC RX 250 WO HCPCS

## 2018-01-01 PROCEDURE — 85378 FIBRIN DEGRADE SEMIQUANT: CPT

## 2018-01-01 PROCEDURE — APPSS45 APP SPLIT SHARED TIME 31-45 MINUTES: Performed by: NURSE PRACTITIONER

## 2018-01-01 PROCEDURE — 82565 ASSAY OF CREATININE: CPT

## 2018-01-01 PROCEDURE — 85027 COMPLETE CBC AUTOMATED: CPT

## 2018-01-01 PROCEDURE — 93005 ELECTROCARDIOGRAM TRACING: CPT | Performed by: EMERGENCY MEDICINE

## 2018-01-01 PROCEDURE — 1111F DSCHRG MED/CURRENT MED MERGE: CPT | Performed by: INTERNAL MEDICINE

## 2018-01-01 PROCEDURE — 6360000002 HC RX W HCPCS: Performed by: NURSE PRACTITIONER

## 2018-01-01 PROCEDURE — 74177 CT ABD & PELVIS W/CONTRAST: CPT

## 2018-01-01 PROCEDURE — 93308 TTE F-UP OR LMTD: CPT

## 2018-01-01 PROCEDURE — G8978 MOBILITY CURRENT STATUS: HCPCS

## 2018-01-01 PROCEDURE — 99291 CRITICAL CARE FIRST HOUR: CPT

## 2018-01-01 PROCEDURE — 85730 THROMBOPLASTIN TIME PARTIAL: CPT

## 2018-01-01 PROCEDURE — G8981 BODY POS CURRENT STATUS: HCPCS | Performed by: PHYSICAL THERAPIST

## 2018-01-01 PROCEDURE — 96366 THER/PROPH/DIAG IV INF ADDON: CPT

## 2018-01-01 PROCEDURE — G8979 MOBILITY GOAL STATUS: HCPCS | Performed by: PHYSICAL THERAPIST

## 2018-01-01 PROCEDURE — 87486 CHLMYD PNEUM DNA AMP PROBE: CPT

## 2018-01-01 PROCEDURE — 83690 ASSAY OF LIPASE: CPT

## 2018-01-01 RX ORDER — 0.9 % SODIUM CHLORIDE 0.9 %
1000 INTRAVENOUS SOLUTION INTRAVENOUS ONCE
Status: COMPLETED | OUTPATIENT
Start: 2018-01-01 | End: 2018-01-01

## 2018-01-01 RX ORDER — CEFUROXIME AXETIL 250 MG/1
250 TABLET ORAL 2 TIMES DAILY
Refills: 0 | DISCHARGE
Start: 2018-01-01 | End: 2018-01-01

## 2018-01-01 RX ORDER — LANOLIN ALCOHOL/MO/W.PET/CERES
1000 CREAM (GRAM) TOPICAL DAILY
Status: DISCONTINUED | OUTPATIENT
Start: 2018-01-01 | End: 2018-01-01 | Stop reason: HOSPADM

## 2018-01-01 RX ORDER — ONDANSETRON 2 MG/ML
4 INJECTION INTRAMUSCULAR; INTRAVENOUS EVERY 6 HOURS PRN
Status: DISCONTINUED | OUTPATIENT
Start: 2018-01-01 | End: 2018-01-01 | Stop reason: HOSPADM

## 2018-01-01 RX ORDER — OYSTER SHELL CALCIUM WITH VITAMIN D 500; 200 MG/1; [IU]/1
1 TABLET, FILM COATED ORAL 2 TIMES DAILY
Status: DISCONTINUED | OUTPATIENT
Start: 2018-01-01 | End: 2018-01-01 | Stop reason: HOSPADM

## 2018-01-01 RX ORDER — DULOXETIN HYDROCHLORIDE 60 MG/1
60 CAPSULE, DELAYED RELEASE ORAL DAILY
Status: DISCONTINUED | OUTPATIENT
Start: 2018-01-01 | End: 2018-01-01 | Stop reason: HOSPADM

## 2018-01-01 RX ORDER — HYDROXYCHLOROQUINE SULFATE 200 MG/1
200 TABLET, FILM COATED ORAL DAILY
Status: DISCONTINUED | OUTPATIENT
Start: 2018-01-01 | End: 2018-01-01 | Stop reason: HOSPADM

## 2018-01-01 RX ORDER — ACETAMINOPHEN 650 MG/1
650 SUPPOSITORY RECTAL ONCE
Status: COMPLETED | OUTPATIENT
Start: 2018-01-01 | End: 2018-01-01

## 2018-01-01 RX ORDER — BISACODYL 10 MG
10 SUPPOSITORY, RECTAL RECTAL DAILY PRN
Status: DISCONTINUED | OUTPATIENT
Start: 2018-01-01 | End: 2018-01-01 | Stop reason: HOSPADM

## 2018-01-01 RX ORDER — ETOMIDATE 2 MG/ML
INJECTION INTRAVENOUS
Status: COMPLETED
Start: 2018-01-01 | End: 2018-01-01

## 2018-01-01 RX ORDER — AMLODIPINE BESYLATE 5 MG/1
5 TABLET ORAL DAILY
Status: DISCONTINUED | OUTPATIENT
Start: 2018-01-01 | End: 2018-01-01 | Stop reason: HOSPADM

## 2018-01-01 RX ORDER — TRAMADOL HYDROCHLORIDE 50 MG/1
50 TABLET ORAL NIGHTLY
COMMUNITY
Start: 2018-01-01 | End: 2018-01-01

## 2018-01-01 RX ORDER — ROCURONIUM BROMIDE 10 MG/ML
100 INJECTION, SOLUTION INTRAVENOUS ONCE
Status: COMPLETED | OUTPATIENT
Start: 2018-01-01 | End: 2018-01-01

## 2018-01-01 RX ORDER — TRAMADOL HYDROCHLORIDE 50 MG/1
50 TABLET ORAL EVERY 6 HOURS PRN
Status: DISCONTINUED | OUTPATIENT
Start: 2018-01-01 | End: 2018-01-01 | Stop reason: HOSPADM

## 2018-01-01 RX ORDER — CEFUROXIME AXETIL 500 MG/1
500 TABLET ORAL 2 TIMES DAILY
Qty: 20 TABLET | Refills: 0 | Status: SHIPPED | OUTPATIENT
Start: 2018-01-01 | End: 2018-01-01

## 2018-01-01 RX ORDER — ATENOLOL 25 MG/1
25 TABLET ORAL EVERY MORNING
Status: DISCONTINUED | OUTPATIENT
Start: 2018-01-01 | End: 2018-01-01 | Stop reason: HOSPADM

## 2018-01-01 RX ORDER — AMOXICILLIN AND CLAVULANATE POTASSIUM 875; 125 MG/1; MG/1
1 TABLET, FILM COATED ORAL 2 TIMES DAILY
Status: ON HOLD | COMMUNITY
Start: 2018-01-01 | End: 2018-01-01 | Stop reason: HOSPADM

## 2018-01-01 RX ORDER — URSODIOL 300 MG/1
300 CAPSULE ORAL DAILY
Status: DISCONTINUED | OUTPATIENT
Start: 2018-01-01 | End: 2018-01-01 | Stop reason: HOSPADM

## 2018-01-01 RX ORDER — CEFUROXIME AXETIL 500 MG/1
500 TABLET ORAL 2 TIMES DAILY
Qty: 22 TABLET | Refills: 0 | Status: SHIPPED | OUTPATIENT
Start: 2018-01-01 | End: 2018-01-01

## 2018-01-01 RX ORDER — FOLIC ACID 1 MG/1
1 TABLET ORAL DAILY
Status: DISCONTINUED | OUTPATIENT
Start: 2018-01-01 | End: 2018-01-01 | Stop reason: HOSPADM

## 2018-01-01 RX ORDER — SODIUM CHLORIDE 0.9 % (FLUSH) 0.9 %
10 SYRINGE (ML) INJECTION EVERY 12 HOURS SCHEDULED
Status: DISCONTINUED | OUTPATIENT
Start: 2018-01-01 | End: 2018-01-01 | Stop reason: HOSPADM

## 2018-01-01 RX ORDER — ACETAMINOPHEN 650 MG/1
650 SUPPOSITORY RECTAL ONCE
Status: DISCONTINUED | OUTPATIENT
Start: 2018-01-01 | End: 2018-01-01

## 2018-01-01 RX ORDER — DEXTROSE AND SODIUM CHLORIDE 5; .45 G/100ML; G/100ML
INJECTION, SOLUTION INTRAVENOUS CONTINUOUS
Status: DISCONTINUED | OUTPATIENT
Start: 2018-01-01 | End: 2018-01-01

## 2018-01-01 RX ORDER — NOREPINEPHRINE BITARTRATE 1 MG/ML
INJECTION, SOLUTION INTRAVENOUS
Status: DISCONTINUED
Start: 2018-01-01 | End: 2018-01-01

## 2018-01-01 RX ORDER — 0.9 % SODIUM CHLORIDE 0.9 %
550 INTRAVENOUS SOLUTION INTRAVENOUS ONCE
Status: COMPLETED | OUTPATIENT
Start: 2018-01-01 | End: 2018-01-01

## 2018-01-01 RX ORDER — SODIUM CHLORIDE 0.9 % (FLUSH) 0.9 %
10 SYRINGE (ML) INJECTION PRN
Status: DISCONTINUED | OUTPATIENT
Start: 2018-01-01 | End: 2018-01-01 | Stop reason: HOSPADM

## 2018-01-01 RX ORDER — ACETAMINOPHEN 325 MG/1
650 TABLET ORAL EVERY 4 HOURS PRN
Status: DISCONTINUED | OUTPATIENT
Start: 2018-01-01 | End: 2018-01-01

## 2018-01-01 RX ORDER — ACETAMINOPHEN 500 MG
TABLET ORAL
Status: COMPLETED
Start: 2018-01-01 | End: 2018-01-01

## 2018-01-01 RX ORDER — POTASSIUM CHLORIDE 20 MEQ/1
20 TABLET, EXTENDED RELEASE ORAL 3 TIMES DAILY
COMMUNITY

## 2018-01-01 RX ORDER — GLYCOPYRROLATE 0.2 MG/ML
0.2 INJECTION INTRAMUSCULAR; INTRAVENOUS EVERY 4 HOURS
Status: DISCONTINUED | OUTPATIENT
Start: 2018-01-01 | End: 2018-01-01 | Stop reason: HOSPADM

## 2018-01-01 RX ORDER — POTASSIUM CHLORIDE 20 MEQ/1
20 TABLET, EXTENDED RELEASE ORAL 3 TIMES DAILY
Status: DISCONTINUED | OUTPATIENT
Start: 2018-01-01 | End: 2018-01-01 | Stop reason: HOSPADM

## 2018-01-01 RX ORDER — PREDNISONE 20 MG/1
20 TABLET ORAL DAILY
Status: DISCONTINUED | OUTPATIENT
Start: 2018-01-01 | End: 2018-01-01 | Stop reason: HOSPADM

## 2018-01-01 RX ORDER — AMOXICILLIN AND CLAVULANATE POTASSIUM 875; 125 MG/1; MG/1
1 TABLET, FILM COATED ORAL 2 TIMES DAILY
Status: DISCONTINUED | OUTPATIENT
Start: 2018-01-01 | End: 2018-01-01 | Stop reason: SDUPTHER

## 2018-01-01 RX ORDER — TRAMADOL HYDROCHLORIDE 50 MG/1
50 TABLET ORAL EVERY 6 HOURS PRN
Qty: 30 TABLET | Refills: 0 | Status: SHIPPED | OUTPATIENT
Start: 2018-01-01 | End: 2019-07-09

## 2018-01-01 RX ORDER — BISACODYL 10 MG
10 SUPPOSITORY, RECTAL RECTAL DAILY PRN
COMMUNITY

## 2018-01-01 RX ORDER — ROCURONIUM BROMIDE 10 MG/ML
INJECTION, SOLUTION INTRAVENOUS
Status: COMPLETED
Start: 2018-01-01 | End: 2018-01-01

## 2018-01-01 RX ORDER — MIDAZOLAM HYDROCHLORIDE 1 MG/ML
2 INJECTION INTRAMUSCULAR; INTRAVENOUS ONCE
Status: COMPLETED | OUTPATIENT
Start: 2018-01-01 | End: 2018-01-01

## 2018-01-01 RX ORDER — ACETAMINOPHEN 325 MG/1
650 TABLET ORAL EVERY 4 HOURS PRN
Status: DISCONTINUED | OUTPATIENT
Start: 2018-01-01 | End: 2018-01-01 | Stop reason: HOSPADM

## 2018-01-01 RX ORDER — M-VIT,TX,IRON,MINS/CALC/FOLIC 27MG-0.4MG
1 TABLET ORAL DAILY
Status: DISCONTINUED | OUTPATIENT
Start: 2018-01-01 | End: 2018-01-01 | Stop reason: HOSPADM

## 2018-01-01 RX ORDER — FOLIC ACID 1 MG/1
1000 TABLET ORAL DAILY
Status: DISCONTINUED | OUTPATIENT
Start: 2018-01-01 | End: 2018-01-01 | Stop reason: HOSPADM

## 2018-01-01 RX ORDER — LANOLIN ALCOHOL/MO/W.PET/CERES
1000 CREAM (GRAM) TOPICAL DAILY
COMMUNITY

## 2018-01-01 RX ORDER — 0.9 % SODIUM CHLORIDE 0.9 %
500 INTRAVENOUS SOLUTION INTRAVENOUS ONCE
Status: COMPLETED | OUTPATIENT
Start: 2018-01-01 | End: 2018-01-01

## 2018-01-01 RX ORDER — ACETAMINOPHEN 500 MG
1000 TABLET ORAL ONCE
Status: COMPLETED | OUTPATIENT
Start: 2018-01-01 | End: 2018-01-01

## 2018-01-01 RX ORDER — PENTAMIDINE ISETHIONATE 300 MG/300MG
300 INHALANT RESPIRATORY (INHALATION) ONCE
Status: COMPLETED | OUTPATIENT
Start: 2018-01-01 | End: 2018-01-01

## 2018-01-01 RX ORDER — TRAMADOL HYDROCHLORIDE 50 MG/1
50 TABLET ORAL NIGHTLY
Status: DISCONTINUED | OUTPATIENT
Start: 2018-01-01 | End: 2018-01-01 | Stop reason: HOSPADM

## 2018-01-01 RX ORDER — LORAZEPAM 2 MG/ML
1 INJECTION INTRAMUSCULAR EVERY 4 HOURS PRN
Status: DISCONTINUED | OUTPATIENT
Start: 2018-01-01 | End: 2018-01-01 | Stop reason: HOSPADM

## 2018-01-01 RX ORDER — SODIUM CHLORIDE 9 MG/ML
INJECTION, SOLUTION INTRAVENOUS CONTINUOUS
Status: DISCONTINUED | OUTPATIENT
Start: 2018-01-01 | End: 2018-01-01

## 2018-01-01 RX ORDER — PREDNISONE 20 MG/1
20 TABLET ORAL DAILY
COMMUNITY

## 2018-01-01 RX ORDER — FENTANYL CITRATE 50 UG/ML
50 INJECTION, SOLUTION INTRAMUSCULAR; INTRAVENOUS ONCE
Status: COMPLETED | OUTPATIENT
Start: 2018-01-01 | End: 2018-01-01

## 2018-01-01 RX ORDER — ACETAMINOPHEN 650 MG/1
650 SUPPOSITORY RECTAL EVERY 4 HOURS PRN
Status: DISCONTINUED | OUTPATIENT
Start: 2018-01-01 | End: 2018-01-01 | Stop reason: HOSPADM

## 2018-01-01 RX ORDER — 0.9 % SODIUM CHLORIDE 0.9 %
30 INTRAVENOUS SOLUTION INTRAVENOUS ONCE
Status: DISCONTINUED | OUTPATIENT
Start: 2018-01-01 | End: 2018-01-01

## 2018-01-01 RX ORDER — MORPHINE SULFATE 2 MG/ML
1 INJECTION, SOLUTION INTRAMUSCULAR; INTRAVENOUS
Status: DISCONTINUED | OUTPATIENT
Start: 2018-01-01 | End: 2018-01-01 | Stop reason: HOSPADM

## 2018-01-01 RX ORDER — TRAMADOL HYDROCHLORIDE 50 MG/1
50 TABLET ORAL DAILY
COMMUNITY

## 2018-01-01 RX ORDER — PROPOFOL 10 MG/ML
INJECTION, EMULSION INTRAVENOUS
Status: COMPLETED
Start: 2018-01-01 | End: 2018-01-01

## 2018-01-01 RX ORDER — PROPOFOL 10 MG/ML
10 INJECTION, EMULSION INTRAVENOUS ONCE
Status: COMPLETED | OUTPATIENT
Start: 2018-01-01 | End: 2018-01-01

## 2018-01-01 RX ORDER — LANOLIN ALCOHOL/MO/W.PET/CERES
800 CREAM (GRAM) TOPICAL DAILY
Status: DISCONTINUED | OUTPATIENT
Start: 2018-01-01 | End: 2018-01-01 | Stop reason: CLARIF

## 2018-01-01 RX ORDER — MULTIVITAMIN/IRON/FOLIC ACID 18MG-0.4MG
1 TABLET ORAL DAILY
COMMUNITY

## 2018-01-01 RX ORDER — ACETAMINOPHEN 500 MG
500 TABLET ORAL EVERY 4 HOURS PRN
Status: DISCONTINUED | OUTPATIENT
Start: 2018-01-01 | End: 2018-01-01 | Stop reason: HOSPADM

## 2018-01-01 RX ORDER — ETOMIDATE 2 MG/ML
20 INJECTION INTRAVENOUS ONCE
Status: COMPLETED | OUTPATIENT
Start: 2018-01-01 | End: 2018-01-01

## 2018-01-01 RX ORDER — ATORVASTATIN CALCIUM 40 MG/1
40 TABLET, FILM COATED ORAL NIGHTLY
Status: DISCONTINUED | OUTPATIENT
Start: 2018-01-01 | End: 2018-01-01 | Stop reason: HOSPADM

## 2018-01-01 RX ORDER — ACETAMINOPHEN 650 MG/1
SUPPOSITORY RECTAL
Status: COMPLETED
Start: 2018-01-01 | End: 2018-01-01

## 2018-01-01 RX ADMIN — Medication 10 ML: at 19:53

## 2018-01-01 RX ADMIN — GLYCOPYRROLATE 0.2 MG: 0.2 INJECTION INTRAMUSCULAR; INTRAVENOUS at 06:23

## 2018-01-01 RX ADMIN — ROCURONIUM BROMIDE 100 MG: 10 INJECTION, SOLUTION INTRAVENOUS at 14:16

## 2018-01-01 RX ADMIN — TRAMADOL HYDROCHLORIDE 50 MG: 50 TABLET, FILM COATED ORAL at 17:48

## 2018-01-01 RX ADMIN — PREDNISONE 20 MG: 20 TABLET ORAL at 10:05

## 2018-01-01 RX ADMIN — Medication 1000 MCG: at 09:30

## 2018-01-01 RX ADMIN — ATENOLOL 25 MG: 25 TABLET ORAL at 09:15

## 2018-01-01 RX ADMIN — DULOXETINE HYDROCHLORIDE 60 MG: 60 CAPSULE, DELAYED RELEASE ORAL at 08:04

## 2018-01-01 RX ADMIN — URSODIOL 300 MG: 300 CAPSULE ORAL at 09:23

## 2018-01-01 RX ADMIN — PIPERACILLIN SODIUM AND TAZOBACTAM SODIUM 3.38 G: 3; .375 INJECTION, POWDER, LYOPHILIZED, FOR SOLUTION INTRAVENOUS at 09:22

## 2018-01-01 RX ADMIN — HYDROXYCHLOROQUINE SULFATE 200 MG: 200 TABLET, FILM COATED ORAL at 09:16

## 2018-01-01 RX ADMIN — CARBIDOPA AND LEVODOPA 1 TABLET: 25; 100 TABLET ORAL at 09:12

## 2018-01-01 RX ADMIN — MORPHINE SULFATE 1 MG: 2 INJECTION, SOLUTION INTRAMUSCULAR; INTRAVENOUS at 21:05

## 2018-01-01 RX ADMIN — FENTANYL CITRATE 50 MCG: 50 INJECTION INTRAMUSCULAR; INTRAVENOUS at 14:31

## 2018-01-01 RX ADMIN — CARBIDOPA AND LEVODOPA 1 TABLET: 25; 100 TABLET ORAL at 14:28

## 2018-01-01 RX ADMIN — Medication 10 ML: at 21:21

## 2018-01-01 RX ADMIN — CALCIUM CARBONATE-VITAMIN D TAB 500 MG-200 UNIT 1 TABLET: 500-200 TAB at 19:58

## 2018-01-01 RX ADMIN — ATENOLOL 25 MG: 25 TABLET ORAL at 09:12

## 2018-01-01 RX ADMIN — ASPIRIN 325 MG: 325 TABLET, COATED ORAL at 10:05

## 2018-01-01 RX ADMIN — AMLODIPINE BESYLATE 5 MG: 5 TABLET ORAL at 21:53

## 2018-01-01 RX ADMIN — PIPERACILLIN SODIUM AND TAZOBACTAM SODIUM 3.38 G: 3; .375 INJECTION, POWDER, LYOPHILIZED, FOR SOLUTION INTRAVENOUS at 09:36

## 2018-01-01 RX ADMIN — CARBIDOPA AND LEVODOPA 1 TABLET: 25; 100 TABLET ORAL at 15:29

## 2018-01-01 RX ADMIN — POTASSIUM CHLORIDE 20 MEQ: 20 TABLET, EXTENDED RELEASE ORAL at 20:39

## 2018-01-01 RX ADMIN — AMLODIPINE BESYLATE 5 MG: 5 TABLET ORAL at 10:57

## 2018-01-01 RX ADMIN — POTASSIUM CHLORIDE 20 MEQ: 20 TABLET, EXTENDED RELEASE ORAL at 14:28

## 2018-01-01 RX ADMIN — PIPERACILLIN SODIUM AND TAZOBACTAM SODIUM 3.38 G: 3; .375 INJECTION, POWDER, LYOPHILIZED, FOR SOLUTION INTRAVENOUS at 01:16

## 2018-01-01 RX ADMIN — SODIUM CHLORIDE: 9 INJECTION, SOLUTION INTRAVENOUS at 07:49

## 2018-01-01 RX ADMIN — FOLIC ACID 1000 MCG: 1 TABLET ORAL at 11:02

## 2018-01-01 RX ADMIN — Medication 10 ML: at 09:14

## 2018-01-01 RX ADMIN — ATORVASTATIN CALCIUM 40 MG: 40 TABLET, FILM COATED ORAL at 20:19

## 2018-01-01 RX ADMIN — CALCIUM CARBONATE-VITAMIN D TAB 500 MG-200 UNIT 1 TABLET: 500-200 TAB at 20:10

## 2018-01-01 RX ADMIN — CALCIUM CARBONATE-VITAMIN D TAB 500 MG-200 UNIT 1 TABLET: 500-200 TAB at 20:39

## 2018-01-01 RX ADMIN — Medication 1000 MG: at 03:05

## 2018-01-01 RX ADMIN — TRAMADOL HYDROCHLORIDE 50 MG: 50 TABLET, FILM COATED ORAL at 08:30

## 2018-01-01 RX ADMIN — CALCIUM CARBONATE-VITAMIN D TAB 500 MG-200 UNIT 1 TABLET: 500-200 TAB at 10:14

## 2018-01-01 RX ADMIN — ASPIRIN 325 MG: 325 TABLET, DELAYED RELEASE ORAL at 09:46

## 2018-01-01 RX ADMIN — WATER 2 G: 1 INJECTION INTRAMUSCULAR; INTRAVENOUS; SUBCUTANEOUS at 17:29

## 2018-01-01 RX ADMIN — PIPERACILLIN SODIUM AND TAZOBACTAM SODIUM 3.38 G: 3; .375 INJECTION, POWDER, LYOPHILIZED, FOR SOLUTION INTRAVENOUS at 21:51

## 2018-01-01 RX ADMIN — ASPIRIN 325 MG: 325 TABLET, DELAYED RELEASE ORAL at 12:05

## 2018-01-01 RX ADMIN — Medication 10 ML: at 10:07

## 2018-01-01 RX ADMIN — POTASSIUM CHLORIDE 20 MEQ: 20 TABLET, EXTENDED RELEASE ORAL at 09:37

## 2018-01-01 RX ADMIN — ATENOLOL 25 MG: 25 TABLET ORAL at 09:46

## 2018-01-01 RX ADMIN — POTASSIUM CHLORIDE 20 MEQ: 20 TABLET, EXTENDED RELEASE ORAL at 14:51

## 2018-01-01 RX ADMIN — HYDROXYCHLOROQUINE SULFATE 200 MG: 200 TABLET, FILM COATED ORAL at 09:15

## 2018-01-01 RX ADMIN — CARBIDOPA AND LEVODOPA 1 TABLET: 25; 100 TABLET ORAL at 12:06

## 2018-01-01 RX ADMIN — URSODIOL 300 MG: 300 CAPSULE ORAL at 09:30

## 2018-01-01 RX ADMIN — CARBIDOPA AND LEVODOPA 1 TABLET: 25; 100 TABLET ORAL at 20:54

## 2018-01-01 RX ADMIN — DULOXETINE HYDROCHLORIDE 60 MG: 60 CAPSULE, DELAYED RELEASE ORAL at 08:50

## 2018-01-01 RX ADMIN — POTASSIUM CHLORIDE 20 MEQ: 20 TABLET, EXTENDED RELEASE ORAL at 08:49

## 2018-01-01 RX ADMIN — POTASSIUM CHLORIDE 20 MEQ: 20 TABLET, EXTENDED RELEASE ORAL at 13:01

## 2018-01-01 RX ADMIN — Medication 10 ML: at 20:10

## 2018-01-01 RX ADMIN — Medication 1 MG: at 10:05

## 2018-01-01 RX ADMIN — ATORVASTATIN CALCIUM 40 MG: 40 TABLET, FILM COATED ORAL at 20:54

## 2018-01-01 RX ADMIN — PREDNISONE 20 MG: 20 TABLET ORAL at 09:12

## 2018-01-01 RX ADMIN — CARBIDOPA AND LEVODOPA 1 TABLET: 25; 100 TABLET ORAL at 09:34

## 2018-01-01 RX ADMIN — ATENOLOL 25 MG: 25 TABLET ORAL at 10:05

## 2018-01-01 RX ADMIN — CARBIDOPA AND LEVODOPA 1 TABLET: 25; 100 TABLET ORAL at 14:50

## 2018-01-01 RX ADMIN — DULOXETINE HYDROCHLORIDE 60 MG: 60 CAPSULE, DELAYED RELEASE ORAL at 11:02

## 2018-01-01 RX ADMIN — Medication 1000 MCG: at 09:15

## 2018-01-01 RX ADMIN — CARBIDOPA AND LEVODOPA 1 TABLET: 25; 100 TABLET ORAL at 10:06

## 2018-01-01 RX ADMIN — ENOXAPARIN SODIUM 40 MG: 40 INJECTION SUBCUTANEOUS at 12:04

## 2018-01-01 RX ADMIN — DULOXETINE HYDROCHLORIDE 60 MG: 60 CAPSULE, DELAYED RELEASE ORAL at 21:43

## 2018-01-01 RX ADMIN — POTASSIUM CHLORIDE 20 MEQ: 20 TABLET, EXTENDED RELEASE ORAL at 20:54

## 2018-01-01 RX ADMIN — AMLODIPINE BESYLATE 5 MG: 5 TABLET ORAL at 08:50

## 2018-01-01 RX ADMIN — URSODIOL 300 MG: 300 CAPSULE ORAL at 09:11

## 2018-01-01 RX ADMIN — POTASSIUM CHLORIDE 20 MEQ: 20 TABLET, EXTENDED RELEASE ORAL at 09:36

## 2018-01-01 RX ADMIN — METRONIDAZOLE 500 MG: 500 INJECTION, SOLUTION INTRAVENOUS at 17:20

## 2018-01-01 RX ADMIN — POTASSIUM CHLORIDE 20 MEQ: 20 TABLET, EXTENDED RELEASE ORAL at 19:53

## 2018-01-01 RX ADMIN — CARBIDOPA AND LEVODOPA 1 TABLET: 25; 100 TABLET ORAL at 20:10

## 2018-01-01 RX ADMIN — URSODIOL 300 MG: 300 CAPSULE ORAL at 10:18

## 2018-01-01 RX ADMIN — URSODIOL 300 MG: 300 CAPSULE ORAL at 08:49

## 2018-01-01 RX ADMIN — AMLODIPINE BESYLATE 5 MG: 5 TABLET ORAL at 10:05

## 2018-01-01 RX ADMIN — HYDROXYCHLOROQUINE SULFATE 200 MG: 200 TABLET, FILM COATED ORAL at 10:05

## 2018-01-01 RX ADMIN — SODIUM CHLORIDE 500 ML: 9 INJECTION, SOLUTION INTRAVENOUS at 12:59

## 2018-01-01 RX ADMIN — POTASSIUM CHLORIDE 20 MEQ: 20 TABLET, EXTENDED RELEASE ORAL at 21:20

## 2018-01-01 RX ADMIN — CEFEPIME HYDROCHLORIDE 1 G: 1 INJECTION, POWDER, FOR SOLUTION INTRAMUSCULAR; INTRAVENOUS at 14:50

## 2018-01-01 RX ADMIN — PREDNISONE 20 MG: 20 TABLET ORAL at 10:17

## 2018-01-01 RX ADMIN — SODIUM CHLORIDE 1000 ML: 9 INJECTION, SOLUTION INTRAVENOUS at 02:51

## 2018-01-01 RX ADMIN — CARBIDOPA AND LEVODOPA 1 TABLET: 25; 100 TABLET ORAL at 09:44

## 2018-01-01 RX ADMIN — DULOXETINE HYDROCHLORIDE 60 MG: 60 CAPSULE, DELAYED RELEASE ORAL at 09:43

## 2018-01-01 RX ADMIN — ENOXAPARIN SODIUM 40 MG: 100 INJECTION SUBCUTANEOUS at 21:52

## 2018-01-01 RX ADMIN — CARBIDOPA AND LEVODOPA 1 TABLET: 25; 100 TABLET ORAL at 08:50

## 2018-01-01 RX ADMIN — CEFEPIME HYDROCHLORIDE 2 G: 2 INJECTION, POWDER, FOR SOLUTION INTRAVENOUS at 15:05

## 2018-01-01 RX ADMIN — Medication 1 MG: at 09:30

## 2018-01-01 RX ADMIN — CARBIDOPA AND LEVODOPA 1 TABLET: 25; 100 TABLET ORAL at 12:47

## 2018-01-01 RX ADMIN — PREDNISONE 20 MG: 20 TABLET ORAL at 09:15

## 2018-01-01 RX ADMIN — FOLIC ACID 1 MG: 1 TABLET ORAL at 08:50

## 2018-01-01 RX ADMIN — CARBIDOPA AND LEVODOPA 1 TABLET: 25; 100 TABLET ORAL at 20:19

## 2018-01-01 RX ADMIN — ATENOLOL 25 MG: 25 TABLET ORAL at 12:05

## 2018-01-01 RX ADMIN — TRAMADOL HYDROCHLORIDE 50 MG: 50 TABLET, FILM COATED ORAL at 19:58

## 2018-01-01 RX ADMIN — URSODIOL 300 MG: 300 CAPSULE ORAL at 21:41

## 2018-01-01 RX ADMIN — AMLODIPINE BESYLATE 5 MG: 5 TABLET ORAL at 09:23

## 2018-01-01 RX ADMIN — HYDROXYCHLOROQUINE SULFATE 200 MG: 200 TABLET, FILM COATED ORAL at 09:30

## 2018-01-01 RX ADMIN — ENOXAPARIN SODIUM 40 MG: 40 INJECTION SUBCUTANEOUS at 09:16

## 2018-01-01 RX ADMIN — PREDNISONE 20 MG: 20 TABLET ORAL at 09:34

## 2018-01-01 RX ADMIN — CALCIUM CARBONATE-VITAMIN D TAB 500 MG-200 UNIT 1 TABLET: 500-200 TAB at 08:04

## 2018-01-01 RX ADMIN — PREDNISONE 20 MG: 20 TABLET ORAL at 09:29

## 2018-01-01 RX ADMIN — ATENOLOL 25 MG: 25 TABLET ORAL at 10:14

## 2018-01-01 RX ADMIN — ENOXAPARIN SODIUM 40 MG: 40 INJECTION SUBCUTANEOUS at 09:44

## 2018-01-01 RX ADMIN — ATENOLOL 25 MG: 25 TABLET ORAL at 09:29

## 2018-01-01 RX ADMIN — Medication 10 ML: at 09:41

## 2018-01-01 RX ADMIN — CALCIUM CARBONATE-VITAMIN D TAB 500 MG-200 UNIT 1 TABLET: 500-200 TAB at 10:06

## 2018-01-01 RX ADMIN — PIPERACILLIN SODIUM AND TAZOBACTAM SODIUM 3.38 G: 3; .375 INJECTION, POWDER, LYOPHILIZED, FOR SOLUTION INTRAVENOUS at 16:50

## 2018-01-01 RX ADMIN — FOLIC ACID 1 MG: 1 TABLET ORAL at 09:23

## 2018-01-01 RX ADMIN — SODIUM CHLORIDE 550 ML: 9 INJECTION, SOLUTION INTRAVENOUS at 15:59

## 2018-01-01 RX ADMIN — Medication 10 ML: at 09:23

## 2018-01-01 RX ADMIN — FOLIC ACID 1 MG: 1 TABLET ORAL at 09:43

## 2018-01-01 RX ADMIN — PREDNISONE 20 MG: 20 TABLET ORAL at 08:04

## 2018-01-01 RX ADMIN — ASPIRIN 325 MG: 325 TABLET, DELAYED RELEASE ORAL at 08:04

## 2018-01-01 RX ADMIN — ATENOLOL 25 MG: 25 TABLET ORAL at 09:34

## 2018-01-01 RX ADMIN — DEXTROSE AND SODIUM CHLORIDE: 5; 450 INJECTION, SOLUTION INTRAVENOUS at 06:33

## 2018-01-01 RX ADMIN — CALCIUM CARBONATE-VITAMIN D TAB 500 MG-200 UNIT 1 TABLET: 500-200 TAB at 21:32

## 2018-01-01 RX ADMIN — ATENOLOL 25 MG: 25 TABLET ORAL at 11:01

## 2018-01-01 RX ADMIN — CARBIDOPA AND LEVODOPA 1 TABLET: 25; 100 TABLET ORAL at 09:30

## 2018-01-01 RX ADMIN — POTASSIUM CHLORIDE 20 MEQ: 20 TABLET, EXTENDED RELEASE ORAL at 20:14

## 2018-01-01 RX ADMIN — CEFEPIME HYDROCHLORIDE 1 G: 1 INJECTION, POWDER, FOR SOLUTION INTRAMUSCULAR; INTRAVENOUS at 16:56

## 2018-01-01 RX ADMIN — IOPAMIDOL 60 ML: 755 INJECTION, SOLUTION INTRAVENOUS at 17:25

## 2018-01-01 RX ADMIN — CYANOCOBALAMIN TAB 1000 MCG 1000 MCG: 1000 TAB at 09:29

## 2018-01-01 RX ADMIN — CEFEPIME 2 G: 2 INJECTION, POWDER, FOR SOLUTION INTRAMUSCULAR; INTRAVENOUS at 07:52

## 2018-01-01 RX ADMIN — AMLODIPINE BESYLATE 5 MG: 5 TABLET ORAL at 09:43

## 2018-01-01 RX ADMIN — FOLIC ACID 1 MG: 1 TABLET ORAL at 09:45

## 2018-01-01 RX ADMIN — CARBIDOPA AND LEVODOPA 1 TABLET: 25; 100 TABLET ORAL at 19:53

## 2018-01-01 RX ADMIN — CEFEPIME HYDROCHLORIDE 2 G: 2 INJECTION, POWDER, FOR SOLUTION INTRAVENOUS at 03:57

## 2018-01-01 RX ADMIN — HYDROXYCHLOROQUINE SULFATE 200 MG: 200 TABLET, FILM COATED ORAL at 21:42

## 2018-01-01 RX ADMIN — Medication 1000 MCG: at 11:01

## 2018-01-01 RX ADMIN — Medication 10 ML: at 20:14

## 2018-01-01 RX ADMIN — POTASSIUM CHLORIDE 20 MEQ: 20 TABLET, EXTENDED RELEASE ORAL at 14:41

## 2018-01-01 RX ADMIN — ROCURONIUM BROMIDE 100 MG: 10 INJECTION INTRAVENOUS at 14:16

## 2018-01-01 RX ADMIN — AMLODIPINE BESYLATE 5 MG: 5 TABLET ORAL at 09:30

## 2018-01-01 RX ADMIN — POTASSIUM CHLORIDE 20 MEQ: 20 TABLET, EXTENDED RELEASE ORAL at 08:04

## 2018-01-01 RX ADMIN — ASPIRIN 325 MG: 325 TABLET, DELAYED RELEASE ORAL at 11:02

## 2018-01-01 RX ADMIN — ASPIRIN 325 MG: 325 TABLET, DELAYED RELEASE ORAL at 09:12

## 2018-01-01 RX ADMIN — FOLIC ACID 1 MG: 1 TABLET ORAL at 09:29

## 2018-01-01 RX ADMIN — CYANOCOBALAMIN TAB 1000 MCG 1000 MCG: 1000 TAB at 09:46

## 2018-01-01 RX ADMIN — DULOXETINE HYDROCHLORIDE 60 MG: 60 CAPSULE, DELAYED RELEASE ORAL at 09:30

## 2018-01-01 RX ADMIN — HYDROXYCHLOROQUINE SULFATE 200 MG: 200 TABLET, FILM COATED ORAL at 09:42

## 2018-01-01 RX ADMIN — Medication 1000 MCG: at 09:16

## 2018-01-01 RX ADMIN — URSODIOL 300 MG: 300 CAPSULE ORAL at 08:04

## 2018-01-01 RX ADMIN — ACETAMINOPHEN 650 MG: 650 SUPPOSITORY RECTAL at 15:00

## 2018-01-01 RX ADMIN — Medication 1 MG: at 10:16

## 2018-01-01 RX ADMIN — HYDROXYCHLOROQUINE SULFATE 200 MG: 200 TABLET, FILM COATED ORAL at 12:05

## 2018-01-01 RX ADMIN — CARBIDOPA AND LEVODOPA 1 TABLET: 25; 100 TABLET ORAL at 09:29

## 2018-01-01 RX ADMIN — POTASSIUM CHLORIDE 20 MEQ: 20 TABLET, EXTENDED RELEASE ORAL at 09:15

## 2018-01-01 RX ADMIN — Medication 10 ML: at 09:29

## 2018-01-01 RX ADMIN — CARBIDOPA AND LEVODOPA 1 TABLET: 25; 100 TABLET ORAL at 20:39

## 2018-01-01 RX ADMIN — URSODIOL 300 MG: 300 CAPSULE ORAL at 09:14

## 2018-01-01 RX ADMIN — CARBIDOPA AND LEVODOPA 1 TABLET: 25; 100 TABLET ORAL at 14:41

## 2018-01-01 RX ADMIN — CARBIDOPA AND LEVODOPA 1 TABLET: 25; 100 TABLET ORAL at 11:00

## 2018-01-01 RX ADMIN — ENOXAPARIN SODIUM 40 MG: 40 INJECTION SUBCUTANEOUS at 09:30

## 2018-01-01 RX ADMIN — ACETAMINOPHEN 650 MG: 325 TABLET, FILM COATED ORAL at 19:53

## 2018-01-01 RX ADMIN — ENOXAPARIN SODIUM 40 MG: 100 INJECTION SUBCUTANEOUS at 08:04

## 2018-01-01 RX ADMIN — FOLIC ACID 1000 MCG: 1 TABLET ORAL at 08:04

## 2018-01-01 RX ADMIN — CALCIUM CARBONATE-VITAMIN D TAB 500 MG-200 UNIT 1 TABLET: 500-200 TAB at 21:42

## 2018-01-01 RX ADMIN — POTASSIUM CHLORIDE 20 MEQ: 20 TABLET, EXTENDED RELEASE ORAL at 09:48

## 2018-01-01 RX ADMIN — ACETAMINOPHEN 650 MG: 650 SUPPOSITORY RECTAL at 03:30

## 2018-01-01 RX ADMIN — CARBIDOPA AND LEVODOPA 1 TABLET: 25; 100 TABLET ORAL at 20:14

## 2018-01-01 RX ADMIN — ATENOLOL 25 MG: 25 TABLET ORAL at 09:30

## 2018-01-01 RX ADMIN — DULOXETINE HYDROCHLORIDE 60 MG: 60 CAPSULE, DELAYED RELEASE ORAL at 09:15

## 2018-01-01 RX ADMIN — TRAMADOL HYDROCHLORIDE 50 MG: 50 TABLET, FILM COATED ORAL at 22:21

## 2018-01-01 RX ADMIN — ATORVASTATIN CALCIUM 40 MG: 40 TABLET, FILM COATED ORAL at 20:52

## 2018-01-01 RX ADMIN — CEFEPIME HYDROCHLORIDE 2 G: 2 INJECTION, POWDER, FOR SOLUTION INTRAVENOUS at 02:54

## 2018-01-01 RX ADMIN — Medication 10 ML: at 09:47

## 2018-01-01 RX ADMIN — DULOXETINE HYDROCHLORIDE 60 MG: 60 CAPSULE, DELAYED RELEASE ORAL at 09:34

## 2018-01-01 RX ADMIN — POTASSIUM CHLORIDE 20 MEQ: 20 TABLET, EXTENDED RELEASE ORAL at 09:16

## 2018-01-01 RX ADMIN — PIPERACILLIN SODIUM AND TAZOBACTAM SODIUM 3.38 G: 3; .375 INJECTION, POWDER, LYOPHILIZED, FOR SOLUTION INTRAVENOUS at 13:01

## 2018-01-01 RX ADMIN — POTASSIUM CHLORIDE 20 MEQ: 20 TABLET, EXTENDED RELEASE ORAL at 13:30

## 2018-01-01 RX ADMIN — DEXTROSE AND SODIUM CHLORIDE: 5; 450 INJECTION, SOLUTION INTRAVENOUS at 09:44

## 2018-01-01 RX ADMIN — ATORVASTATIN CALCIUM 40 MG: 40 TABLET, FILM COATED ORAL at 20:14

## 2018-01-01 RX ADMIN — MORPHINE SULFATE 1 MG/HR: 25 INJECTION, SOLUTION INTRAVENOUS at 21:20

## 2018-01-01 RX ADMIN — CARBIDOPA AND LEVODOPA 1 TABLET: 25; 100 TABLET ORAL at 14:11

## 2018-01-01 RX ADMIN — DULOXETINE HYDROCHLORIDE 60 MG: 60 CAPSULE, DELAYED RELEASE ORAL at 09:23

## 2018-01-01 RX ADMIN — ASPIRIN 325 MG: 325 TABLET, DELAYED RELEASE ORAL at 08:49

## 2018-01-01 RX ADMIN — MULTIPLE VITAMINS W/ MINERALS TAB 1 TABLET: TAB at 09:34

## 2018-01-01 RX ADMIN — ACETAMINOPHEN 1000 MG: 500 TABLET, FILM COATED ORAL at 03:05

## 2018-01-01 RX ADMIN — CARBIDOPA AND LEVODOPA 1 TABLET: 25; 100 TABLET ORAL at 19:05

## 2018-01-01 RX ADMIN — PREDNISONE 20 MG: 20 TABLET ORAL at 11:01

## 2018-01-01 RX ADMIN — HYDROXYCHLOROQUINE SULFATE 200 MG: 200 TABLET, FILM COATED ORAL at 09:29

## 2018-01-01 RX ADMIN — SODIUM CHLORIDE 500 ML: 9 INJECTION, SOLUTION INTRAVENOUS at 12:30

## 2018-01-01 RX ADMIN — CARBIDOPA AND LEVODOPA 1 TABLET: 25; 100 TABLET ORAL at 10:15

## 2018-01-01 RX ADMIN — ENOXAPARIN SODIUM 40 MG: 40 INJECTION SUBCUTANEOUS at 10:06

## 2018-01-01 RX ADMIN — PROPOFOL 10 MCG/KG/MIN: 10 INJECTION, EMULSION INTRAVENOUS at 15:58

## 2018-01-01 RX ADMIN — WATER 2 G: 1 INJECTION INTRAMUSCULAR; INTRAVENOUS; SUBCUTANEOUS at 16:50

## 2018-01-01 RX ADMIN — PIPERACILLIN SODIUM AND TAZOBACTAM SODIUM 3.38 G: 3; .375 INJECTION, POWDER, LYOPHILIZED, FOR SOLUTION INTRAVENOUS at 16:56

## 2018-01-01 RX ADMIN — LORAZEPAM 1 MG: 2 INJECTION INTRAMUSCULAR; INTRAVENOUS at 21:05

## 2018-01-01 RX ADMIN — FOLIC ACID 1000 MCG: 1 TABLET ORAL at 21:42

## 2018-01-01 RX ADMIN — Medication 10 ML: at 20:19

## 2018-01-01 RX ADMIN — PREDNISONE 20 MG: 20 TABLET ORAL at 21:43

## 2018-01-01 RX ADMIN — TRAMADOL HYDROCHLORIDE 50 MG: 50 TABLET, FILM COATED ORAL at 21:32

## 2018-01-01 RX ADMIN — CARBIDOPA AND LEVODOPA 1 TABLET: 25; 100 TABLET ORAL at 21:20

## 2018-01-01 RX ADMIN — PREDNISONE 20 MG: 20 TABLET ORAL at 08:49

## 2018-01-01 RX ADMIN — POTASSIUM CHLORIDE 20 MEQ: 20 TABLET, EXTENDED RELEASE ORAL at 14:03

## 2018-01-01 RX ADMIN — SODIUM CHLORIDE: 9 INJECTION, SOLUTION INTRAVENOUS at 21:53

## 2018-01-01 RX ADMIN — PIPERACILLIN SODIUM AND TAZOBACTAM SODIUM 3.38 G: 3; .375 INJECTION, POWDER, LYOPHILIZED, FOR SOLUTION INTRAVENOUS at 06:20

## 2018-01-01 RX ADMIN — ATENOLOL 25 MG: 25 TABLET ORAL at 09:17

## 2018-01-01 RX ADMIN — Medication 10 ML: at 08:50

## 2018-01-01 RX ADMIN — CYANOCOBALAMIN TAB 1000 MCG 1000 MCG: 1000 TAB at 09:12

## 2018-01-01 RX ADMIN — HYDROXYCHLOROQUINE SULFATE 200 MG: 200 TABLET, FILM COATED ORAL at 09:34

## 2018-01-01 RX ADMIN — CEFTRIAXONE 2 G: 2 INJECTION, POWDER, FOR SOLUTION INTRAMUSCULAR; INTRAVENOUS at 17:52

## 2018-01-01 RX ADMIN — PIPERACILLIN SODIUM AND TAZOBACTAM SODIUM 3.38 G: 3; .375 INJECTION, POWDER, LYOPHILIZED, FOR SOLUTION INTRAVENOUS at 01:10

## 2018-01-01 RX ADMIN — POTASSIUM CHLORIDE 20 MEQ: 20 TABLET, EXTENDED RELEASE ORAL at 09:30

## 2018-01-01 RX ADMIN — CARBIDOPA AND LEVODOPA 1 TABLET: 25; 100 TABLET ORAL at 21:43

## 2018-01-01 RX ADMIN — FOLIC ACID 1 MG: 1 TABLET ORAL at 09:15

## 2018-01-01 RX ADMIN — Medication 1000 MCG: at 10:05

## 2018-01-01 RX ADMIN — POTASSIUM CHLORIDE 20 MEQ: 20 TABLET, EXTENDED RELEASE ORAL at 12:47

## 2018-01-01 RX ADMIN — ATENOLOL 25 MG: 25 TABLET ORAL at 09:42

## 2018-01-01 RX ADMIN — CEFEPIME HYDROCHLORIDE 2 G: 2 INJECTION, POWDER, FOR SOLUTION INTRAVENOUS at 15:39

## 2018-01-01 RX ADMIN — ENOXAPARIN SODIUM 40 MG: 40 INJECTION SUBCUTANEOUS at 09:11

## 2018-01-01 RX ADMIN — CALCIUM CARBONATE-VITAMIN D TAB 500 MG-200 UNIT 1 TABLET: 500-200 TAB at 19:53

## 2018-01-01 RX ADMIN — CARBIDOPA AND LEVODOPA 1 TABLET: 25; 100 TABLET ORAL at 13:23

## 2018-01-01 RX ADMIN — AMLODIPINE BESYLATE 5 MG: 5 TABLET ORAL at 12:07

## 2018-01-01 RX ADMIN — POTASSIUM CHLORIDE 20 MEQ: 20 TABLET, EXTENDED RELEASE ORAL at 11:01

## 2018-01-01 RX ADMIN — AMLODIPINE BESYLATE 5 MG: 5 TABLET ORAL at 10:14

## 2018-01-01 RX ADMIN — HYDROXYCHLOROQUINE SULFATE 200 MG: 200 TABLET, FILM COATED ORAL at 09:23

## 2018-01-01 RX ADMIN — PREDNISONE 20 MG: 20 TABLET ORAL at 09:46

## 2018-01-01 RX ADMIN — FOLIC ACID 1 MG: 1 TABLET ORAL at 12:06

## 2018-01-01 RX ADMIN — CARBIDOPA AND LEVODOPA 1 TABLET: 25; 100 TABLET ORAL at 14:51

## 2018-01-01 RX ADMIN — CARBIDOPA AND LEVODOPA 1 TABLET: 25; 100 TABLET ORAL at 21:32

## 2018-01-01 RX ADMIN — AMLODIPINE BESYLATE 5 MG: 5 TABLET ORAL at 09:29

## 2018-01-01 RX ADMIN — PIPERACILLIN SODIUM AND TAZOBACTAM SODIUM 3.38 G: 3; .375 INJECTION, POWDER, LYOPHILIZED, FOR SOLUTION INTRAVENOUS at 09:14

## 2018-01-01 RX ADMIN — CARBIDOPA AND LEVODOPA 1 TABLET: 25; 100 TABLET ORAL at 14:03

## 2018-01-01 RX ADMIN — ASPIRIN 325 MG: 325 TABLET, DELAYED RELEASE ORAL at 09:35

## 2018-01-01 RX ADMIN — TRAMADOL HYDROCHLORIDE 50 MG: 50 TABLET, FILM COATED ORAL at 16:50

## 2018-01-01 RX ADMIN — POTASSIUM CHLORIDE 20 MEQ: 20 TABLET, EXTENDED RELEASE ORAL at 12:05

## 2018-01-01 RX ADMIN — CYANOCOBALAMIN TAB 1000 MCG 1000 MCG: 1000 TAB at 08:49

## 2018-01-01 RX ADMIN — SODIUM CHLORIDE 1000 ML: 9 INJECTION, SOLUTION INTRAVENOUS at 14:00

## 2018-01-01 RX ADMIN — ENOXAPARIN SODIUM 40 MG: 40 INJECTION SUBCUTANEOUS at 09:23

## 2018-01-01 RX ADMIN — CEFEPIME HYDROCHLORIDE 1 G: 1 INJECTION, POWDER, FOR SOLUTION INTRAMUSCULAR; INTRAVENOUS at 04:49

## 2018-01-01 RX ADMIN — Medication 10 ML: at 09:17

## 2018-01-01 RX ADMIN — URSODIOL 300 MG: 300 CAPSULE ORAL at 11:02

## 2018-01-01 RX ADMIN — POTASSIUM CHLORIDE 20 MEQ: 20 TABLET, EXTENDED RELEASE ORAL at 09:46

## 2018-01-01 RX ADMIN — Medication 1000 MCG: at 09:43

## 2018-01-01 RX ADMIN — MIDAZOLAM 2 MG: 1 INJECTION INTRAMUSCULAR; INTRAVENOUS at 14:31

## 2018-01-01 RX ADMIN — CARBIDOPA AND LEVODOPA 1 TABLET: 25; 100 TABLET ORAL at 09:23

## 2018-01-01 RX ADMIN — SODIUM CHLORIDE 500 ML: 9 INJECTION, SOLUTION INTRAVENOUS at 03:55

## 2018-01-01 RX ADMIN — URSODIOL 300 MG: 300 CAPSULE ORAL at 10:05

## 2018-01-01 RX ADMIN — AMLODIPINE BESYLATE 5 MG: 5 TABLET ORAL at 09:12

## 2018-01-01 RX ADMIN — FOLIC ACID 1 MG: 1 TABLET ORAL at 09:16

## 2018-01-01 RX ADMIN — PREDNISONE 20 MG: 20 TABLET ORAL at 09:43

## 2018-01-01 RX ADMIN — AMLODIPINE BESYLATE 5 MG: 5 TABLET ORAL at 09:44

## 2018-01-01 RX ADMIN — ASPIRIN 325 MG: 325 TABLET, DELAYED RELEASE ORAL at 09:15

## 2018-01-01 RX ADMIN — Medication 1000 MCG: at 09:23

## 2018-01-01 RX ADMIN — POTASSIUM CHLORIDE 20 MEQ: 20 TABLET, EXTENDED RELEASE ORAL at 15:29

## 2018-01-01 RX ADMIN — MORPHINE SULFATE 1 MG: 2 INJECTION, SOLUTION INTRAMUSCULAR; INTRAVENOUS at 22:22

## 2018-01-01 RX ADMIN — ASPIRIN 325 MG: 325 TABLET, DELAYED RELEASE ORAL at 09:42

## 2018-01-01 RX ADMIN — HYDROXYCHLOROQUINE SULFATE 200 MG: 200 TABLET, FILM COATED ORAL at 09:46

## 2018-01-01 RX ADMIN — SODIUM CHLORIDE: 9 INJECTION, SOLUTION INTRAVENOUS at 16:42

## 2018-01-01 RX ADMIN — CALCIUM CARBONATE-VITAMIN D TAB 500 MG-200 UNIT 1 TABLET: 500-200 TAB at 20:30

## 2018-01-01 RX ADMIN — POTASSIUM CHLORIDE 20 MEQ: 20 TABLET, EXTENDED RELEASE ORAL at 20:52

## 2018-01-01 RX ADMIN — ASPIRIN 325 MG: 325 TABLET, DELAYED RELEASE ORAL at 09:16

## 2018-01-01 RX ADMIN — CARBIDOPA AND LEVODOPA 1 TABLET: 25; 100 TABLET ORAL at 19:58

## 2018-01-01 RX ADMIN — TRAMADOL HYDROCHLORIDE 50 MG: 50 TABLET, FILM COATED ORAL at 21:52

## 2018-01-01 RX ADMIN — AMLODIPINE BESYLATE 5 MG: 5 TABLET ORAL at 09:15

## 2018-01-01 RX ADMIN — ACETAMINOPHEN 650 MG: 650 SUPPOSITORY RECTAL at 02:55

## 2018-01-01 RX ADMIN — POTASSIUM CHLORIDE 20 MEQ: 20 TABLET, EXTENDED RELEASE ORAL at 20:30

## 2018-01-01 RX ADMIN — ACETAMINOPHEN 500 MG: 500 TABLET, FILM COATED ORAL at 13:17

## 2018-01-01 RX ADMIN — CALCIUM CARBONATE-VITAMIN D TAB 500 MG-200 UNIT 1 TABLET: 500-200 TAB at 09:43

## 2018-01-01 RX ADMIN — POTASSIUM CHLORIDE 20 MEQ: 20 TABLET, EXTENDED RELEASE ORAL at 14:11

## 2018-01-01 RX ADMIN — URSODIOL 300 MG: 300 CAPSULE ORAL at 09:34

## 2018-01-01 RX ADMIN — DULOXETINE HYDROCHLORIDE 60 MG: 60 CAPSULE, DELAYED RELEASE ORAL at 10:05

## 2018-01-01 RX ADMIN — ATENOLOL 25 MG: 25 TABLET ORAL at 08:50

## 2018-01-01 RX ADMIN — Medication 10 ML: at 09:11

## 2018-01-01 RX ADMIN — Medication 10 ML: at 12:05

## 2018-01-01 RX ADMIN — CEFEPIME HYDROCHLORIDE 2 G: 2 INJECTION, POWDER, FOR SOLUTION INTRAVENOUS at 03:55

## 2018-01-01 RX ADMIN — CARBIDOPA AND LEVODOPA 1 TABLET: 25; 100 TABLET ORAL at 20:30

## 2018-01-01 RX ADMIN — POTASSIUM CHLORIDE 20 MEQ: 20 TABLET, EXTENDED RELEASE ORAL at 10:05

## 2018-01-01 RX ADMIN — ASPIRIN 325 MG: 325 TABLET, COATED ORAL at 10:14

## 2018-01-01 RX ADMIN — URSODIOL 300 MG: 300 CAPSULE ORAL at 09:16

## 2018-01-01 RX ADMIN — DEXTROSE AND SODIUM CHLORIDE: 5; 450 INJECTION, SOLUTION INTRAVENOUS at 09:36

## 2018-01-01 RX ADMIN — CEFEPIME 2 G: 2 INJECTION, POWDER, FOR SOLUTION INTRAMUSCULAR; INTRAVENOUS at 19:10

## 2018-01-01 RX ADMIN — CARBIDOPA AND LEVODOPA 1 TABLET: 25; 100 TABLET ORAL at 20:52

## 2018-01-01 RX ADMIN — SODIUM CHLORIDE 1000 ML: 9 INJECTION, SOLUTION INTRAVENOUS at 02:47

## 2018-01-01 RX ADMIN — MULTIPLE VITAMINS W/ MINERALS TAB 1 TABLET: TAB at 08:04

## 2018-01-01 RX ADMIN — TRAMADOL HYDROCHLORIDE 50 MG: 50 TABLET, FILM COATED ORAL at 14:03

## 2018-01-01 RX ADMIN — CEFEPIME 2 G: 2 INJECTION, POWDER, FOR SOLUTION INTRAMUSCULAR; INTRAVENOUS at 06:50

## 2018-01-01 RX ADMIN — HYDROXYCHLOROQUINE SULFATE 200 MG: 200 TABLET, FILM COATED ORAL at 08:04

## 2018-01-01 RX ADMIN — AMLODIPINE BESYLATE 5 MG: 5 TABLET ORAL at 09:34

## 2018-01-01 RX ADMIN — CALCIUM CARBONATE-VITAMIN D TAB 500 MG-200 UNIT 1 TABLET: 500-200 TAB at 09:35

## 2018-01-01 RX ADMIN — AMLODIPINE BESYLATE 5 MG: 5 TABLET ORAL at 09:16

## 2018-01-01 RX ADMIN — DULOXETINE HYDROCHLORIDE 60 MG: 60 CAPSULE, DELAYED RELEASE ORAL at 12:05

## 2018-01-01 RX ADMIN — ASPIRIN 325 MG: 325 TABLET, COATED ORAL at 09:30

## 2018-01-01 RX ADMIN — HYDROXYCHLOROQUINE SULFATE 200 MG: 200 TABLET, FILM COATED ORAL at 11:02

## 2018-01-01 RX ADMIN — DULOXETINE HYDROCHLORIDE 60 MG: 60 CAPSULE, DELAYED RELEASE ORAL at 09:36

## 2018-01-01 RX ADMIN — CARBIDOPA AND LEVODOPA 1 TABLET: 25; 100 TABLET ORAL at 13:42

## 2018-01-01 RX ADMIN — AMLODIPINE BESYLATE 5 MG: 5 TABLET ORAL at 08:04

## 2018-01-01 RX ADMIN — CALCIUM CARBONATE-VITAMIN D TAB 500 MG-200 UNIT 1 TABLET: 500-200 TAB at 09:30

## 2018-01-01 RX ADMIN — HYDROXYCHLOROQUINE SULFATE 200 MG: 200 TABLET, FILM COATED ORAL at 10:16

## 2018-01-01 RX ADMIN — ENOXAPARIN SODIUM 40 MG: 40 INJECTION SUBCUTANEOUS at 08:51

## 2018-01-01 RX ADMIN — PREDNISONE 20 MG: 20 TABLET ORAL at 09:30

## 2018-01-01 RX ADMIN — HYDROXYCHLOROQUINE SULFATE 200 MG: 200 TABLET, FILM COATED ORAL at 09:12

## 2018-01-01 RX ADMIN — CARBIDOPA AND LEVODOPA 1 TABLET: 25; 100 TABLET ORAL at 17:29

## 2018-01-01 RX ADMIN — ETOMIDATE 20 MG: 2 INJECTION INTRAVENOUS at 14:15

## 2018-01-01 RX ADMIN — PENTAMIDINE ISETHIONATE 300 MG: 300 INHALANT RESPIRATORY (INHALATION) at 16:22

## 2018-01-01 RX ADMIN — CARBIDOPA AND LEVODOPA 1 TABLET: 25; 100 TABLET ORAL at 09:15

## 2018-01-01 RX ADMIN — POTASSIUM CHLORIDE 20 MEQ: 20 TABLET, EXTENDED RELEASE ORAL at 10:17

## 2018-01-01 RX ADMIN — WATER 2 G: 1 INJECTION INTRAMUSCULAR; INTRAVENOUS; SUBCUTANEOUS at 17:50

## 2018-01-01 RX ADMIN — MULTIPLE VITAMINS W/ MINERALS TAB 1 TABLET: TAB at 11:00

## 2018-01-01 RX ADMIN — URSODIOL 300 MG: 300 CAPSULE ORAL at 09:29

## 2018-01-01 RX ADMIN — Medication 10 ML: at 16:55

## 2018-01-01 RX ADMIN — TRAMADOL HYDROCHLORIDE 50 MG: 50 TABLET, FILM COATED ORAL at 21:20

## 2018-01-01 RX ADMIN — ENOXAPARIN SODIUM 40 MG: 40 INJECTION SUBCUTANEOUS at 09:14

## 2018-01-01 RX ADMIN — ASPIRIN 325 MG: 325 TABLET, DELAYED RELEASE ORAL at 09:23

## 2018-01-01 RX ADMIN — CARBIDOPA AND LEVODOPA 1 TABLET: 25; 100 TABLET ORAL at 09:43

## 2018-01-01 RX ADMIN — DULOXETINE HYDROCHLORIDE 60 MG: 60 CAPSULE, DELAYED RELEASE ORAL at 10:15

## 2018-01-01 RX ADMIN — FOLIC ACID 1000 MCG: 1 TABLET ORAL at 09:35

## 2018-01-01 RX ADMIN — Medication 1000 MCG: at 21:42

## 2018-01-01 RX ADMIN — ATENOLOL 25 MG: 25 TABLET ORAL at 08:04

## 2018-01-01 RX ADMIN — Medication 1000 MCG: at 10:18

## 2018-01-01 RX ADMIN — TRAMADOL HYDROCHLORIDE 50 MG: 50 TABLET, FILM COATED ORAL at 10:05

## 2018-01-01 RX ADMIN — IOPAMIDOL 110 ML: 755 INJECTION, SOLUTION INTRAVENOUS at 15:43

## 2018-01-01 RX ADMIN — FOLIC ACID 1 MG: 1 TABLET ORAL at 09:11

## 2018-01-01 RX ADMIN — SODIUM CHLORIDE: 9 INJECTION, SOLUTION INTRAVENOUS at 22:22

## 2018-01-01 RX ADMIN — HYDROXYCHLOROQUINE SULFATE 200 MG: 200 TABLET, FILM COATED ORAL at 08:49

## 2018-01-01 RX ADMIN — URSODIOL 300 MG: 300 CAPSULE ORAL at 09:42

## 2018-01-01 RX ADMIN — POTASSIUM CHLORIDE 20 MEQ: 20 TABLET, EXTENDED RELEASE ORAL at 14:50

## 2018-01-01 RX ADMIN — POTASSIUM CHLORIDE 20 MEQ: 20 TABLET, EXTENDED RELEASE ORAL at 21:52

## 2018-01-01 RX ADMIN — PREDNISONE 20 MG: 20 TABLET ORAL at 12:06

## 2018-01-01 RX ADMIN — POTASSIUM CHLORIDE 20 MEQ: 20 TABLET, EXTENDED RELEASE ORAL at 09:12

## 2018-01-01 RX ADMIN — DULOXETINE HYDROCHLORIDE 60 MG: 60 CAPSULE, DELAYED RELEASE ORAL at 09:12

## 2018-01-01 RX ADMIN — CYANOCOBALAMIN TAB 1000 MCG 1000 MCG: 1000 TAB at 12:08

## 2018-01-01 RX ADMIN — ASPIRIN 325 MG: 325 TABLET, DELAYED RELEASE ORAL at 09:29

## 2018-01-01 RX ADMIN — PREDNISONE 20 MG: 20 TABLET ORAL at 09:16

## 2018-01-01 RX ADMIN — VANCOMYCIN HYDROCHLORIDE 1250 MG: 1 INJECTION, POWDER, LYOPHILIZED, FOR SOLUTION INTRAVENOUS at 15:23

## 2018-01-01 RX ADMIN — Medication 1000 MCG: at 09:34

## 2018-01-01 RX ADMIN — Medication 10 ML: at 10:17

## 2018-01-01 RX ADMIN — SODIUM CHLORIDE 1000 ML: 9 INJECTION, SOLUTION INTRAVENOUS at 05:18

## 2018-01-01 RX ADMIN — CALCIUM CARBONATE-VITAMIN D TAB 500 MG-200 UNIT 1 TABLET: 500-200 TAB at 09:15

## 2018-01-01 RX ADMIN — POTASSIUM CHLORIDE 20 MEQ: 20 TABLET, EXTENDED RELEASE ORAL at 13:42

## 2018-01-01 RX ADMIN — ENOXAPARIN SODIUM 40 MG: 40 INJECTION SUBCUTANEOUS at 10:16

## 2018-01-01 RX ADMIN — SODIUM CHLORIDE: 9 INJECTION, SOLUTION INTRAVENOUS at 14:51

## 2018-01-01 RX ADMIN — POTASSIUM CHLORIDE 20 MEQ: 20 TABLET, EXTENDED RELEASE ORAL at 19:58

## 2018-01-01 RX ADMIN — DULOXETINE HYDROCHLORIDE 60 MG: 60 CAPSULE, DELAYED RELEASE ORAL at 09:17

## 2018-01-01 RX ADMIN — CARBIDOPA AND LEVODOPA 1 TABLET: 25; 100 TABLET ORAL at 09:17

## 2018-01-01 RX ADMIN — CALCIUM CARBONATE-VITAMIN D TAB 500 MG-200 UNIT 1 TABLET: 500-200 TAB at 11:03

## 2018-01-01 RX ADMIN — CARBIDOPA AND LEVODOPA 1 TABLET: 25; 100 TABLET ORAL at 08:04

## 2018-01-01 RX ADMIN — CARBIDOPA AND LEVODOPA 1 TABLET: 25; 100 TABLET ORAL at 13:01

## 2018-01-01 RX ADMIN — ENOXAPARIN SODIUM 40 MG: 100 INJECTION SUBCUTANEOUS at 11:02

## 2018-01-01 RX ADMIN — Medication 10 ML: at 20:52

## 2018-01-01 RX ADMIN — POTASSIUM CHLORIDE 20 MEQ: 20 TABLET, EXTENDED RELEASE ORAL at 13:23

## 2018-01-01 RX ADMIN — CARBIDOPA AND LEVODOPA 1 TABLET: 25; 100 TABLET ORAL at 13:30

## 2018-01-01 RX ADMIN — DULOXETINE HYDROCHLORIDE 60 MG: 60 CAPSULE, DELAYED RELEASE ORAL at 09:44

## 2018-01-01 RX ADMIN — MULTIPLE VITAMINS W/ MINERALS TAB 1 TABLET: TAB at 21:42

## 2018-01-01 RX ADMIN — PREDNISONE 20 MG: 20 TABLET ORAL at 09:23

## 2018-01-01 RX ADMIN — POTASSIUM CHLORIDE 20 MEQ: 20 TABLET, EXTENDED RELEASE ORAL at 20:19

## 2018-01-01 RX ADMIN — LORAZEPAM 1 MG: 2 INJECTION INTRAMUSCULAR; INTRAVENOUS at 02:15

## 2018-01-01 RX ADMIN — ENOXAPARIN SODIUM 40 MG: 40 INJECTION SUBCUTANEOUS at 09:48

## 2018-01-01 RX ADMIN — CARBIDOPA AND LEVODOPA 1 TABLET: 25; 100 TABLET ORAL at 17:50

## 2018-01-01 RX ADMIN — POTASSIUM CHLORIDE 20 MEQ: 20 TABLET, EXTENDED RELEASE ORAL at 09:23

## 2018-01-01 RX ADMIN — SODIUM CHLORIDE: 9 INJECTION, SOLUTION INTRAVENOUS at 09:51

## 2018-01-01 RX ADMIN — POTASSIUM CHLORIDE 20 MEQ: 20 TABLET, EXTENDED RELEASE ORAL at 20:10

## 2018-01-01 RX ADMIN — ENOXAPARIN SODIUM 40 MG: 40 INJECTION SUBCUTANEOUS at 09:36

## 2018-01-01 RX ADMIN — DEXTROSE AND SODIUM CHLORIDE: 5; 450 INJECTION, SOLUTION INTRAVENOUS at 20:39

## 2018-01-01 RX ADMIN — POTASSIUM CHLORIDE 20 MEQ: 20 TABLET, EXTENDED RELEASE ORAL at 21:32

## 2018-01-01 RX ADMIN — ATENOLOL 25 MG: 25 TABLET ORAL at 09:23

## 2018-01-01 RX ADMIN — Medication 1000 MCG: at 08:04

## 2018-01-01 RX ADMIN — CALCIUM CARBONATE-VITAMIN D TAB 500 MG-200 UNIT 1 TABLET: 500-200 TAB at 09:16

## 2018-01-01 RX ADMIN — CALCIUM CARBONATE-VITAMIN D TAB 500 MG-200 UNIT 1 TABLET: 500-200 TAB at 09:23

## 2018-01-01 RX ADMIN — CALCIUM CARBONATE-VITAMIN D TAB 500 MG-200 UNIT 1 TABLET: 500-200 TAB at 21:20

## 2018-01-01 RX ADMIN — ENOXAPARIN SODIUM 40 MG: 100 INJECTION SUBCUTANEOUS at 09:35

## 2018-01-01 RX ADMIN — ASPIRIN 325 MG: 325 TABLET, DELAYED RELEASE ORAL at 21:42

## 2018-01-01 ASSESSMENT — PAIN DESCRIPTION - ORIENTATION
ORIENTATION: LOWER;MID
ORIENTATION: LOWER;MID
ORIENTATION: LEFT
ORIENTATION: LEFT
ORIENTATION: LOWER;MID
ORIENTATION: RIGHT

## 2018-01-01 ASSESSMENT — PAIN DESCRIPTION - PAIN TYPE
TYPE: CHRONIC PAIN

## 2018-01-01 ASSESSMENT — ENCOUNTER SYMPTOMS
VOMITING: 0
COUGH: 0
NAUSEA: 0
SORE THROAT: 0
COLOR CHANGE: 0
ABDOMINAL PAIN: 0
SHORTNESS OF BREATH: 0
DIARRHEA: 0
BACK PAIN: 0

## 2018-01-01 ASSESSMENT — PAIN SCALES - GENERAL
PAINLEVEL_OUTOF10: 0
PAINLEVEL_OUTOF10: 3
PAINLEVEL_OUTOF10: 0
PAINLEVEL_OUTOF10: 0
PAINLEVEL_OUTOF10: 8
PAINLEVEL_OUTOF10: 0
PAINLEVEL_OUTOF10: 5
PAINLEVEL_OUTOF10: 5
PAINLEVEL_OUTOF10: 0
PAINLEVEL_OUTOF10: 5
PAINLEVEL_OUTOF10: 0
PAINLEVEL_OUTOF10: 10
PAINLEVEL_OUTOF10: 0
PAINLEVEL_OUTOF10: 8
PAINLEVEL_OUTOF10: 0
PAINLEVEL_OUTOF10: 0
PAINLEVEL_OUTOF10: 8
PAINLEVEL_OUTOF10: 5
PAINLEVEL_OUTOF10: 0
PAINLEVEL_OUTOF10: 0
PAINLEVEL_OUTOF10: 8
PAINLEVEL_OUTOF10: 0
PAINLEVEL_OUTOF10: 2
PAINLEVEL_OUTOF10: 0
PAINLEVEL_OUTOF10: 5
PAINLEVEL_OUTOF10: 0
PAINLEVEL_OUTOF10: 5
PAINLEVEL_OUTOF10: 0
PAINLEVEL_OUTOF10: 2
PAINLEVEL_OUTOF10: 0
PAINLEVEL_OUTOF10: 8
PAINLEVEL_OUTOF10: 0
PAINLEVEL_OUTOF10: 0
PAINLEVEL_OUTOF10: 10
PAINLEVEL_OUTOF10: 0
PAINLEVEL_OUTOF10: 6
PAINLEVEL_OUTOF10: 0
PAINLEVEL_OUTOF10: 0
PAINLEVEL_OUTOF10: 8
PAINLEVEL_OUTOF10: 0
PAINLEVEL_OUTOF10: 0

## 2018-01-01 ASSESSMENT — PAIN DESCRIPTION - LOCATION
LOCATION: BACK
LOCATION: WRIST
LOCATION: GENERALIZED
LOCATION: BACK
LOCATION: BACK
LOCATION: BACK;SHOULDER
LOCATION: BACK

## 2018-01-01 ASSESSMENT — PAIN DESCRIPTION - FREQUENCY
FREQUENCY: INTERMITTENT

## 2018-01-01 ASSESSMENT — PAIN DESCRIPTION - PROGRESSION
CLINICAL_PROGRESSION: NOT CHANGED
CLINICAL_PROGRESSION: GRADUALLY WORSENING

## 2018-01-01 ASSESSMENT — PAIN DESCRIPTION - DESCRIPTORS
DESCRIPTORS: ACHING;DULL
DESCRIPTORS: ACHING;DISCOMFORT;SORE
DESCRIPTORS: ACHING;NAGGING;JABBING
DESCRIPTORS: ACHING;DULL;DISCOMFORT
DESCRIPTORS: ACHING;CONSTANT;DULL

## 2018-01-01 ASSESSMENT — PAIN DESCRIPTION - ONSET
ONSET: ON-GOING
ONSET: ON-GOING

## 2018-01-23 NOTE — TELEPHONE ENCOUNTER
ursodiol (ACTIGALL) 300 MG capsule Take 300 mg by mouth daily    Calcium-Vitamin D 600-200 MG-UNIT TABS Take 1 tablet by mouth daily     atenolol (TENORMIN) 25 MG tablet Take 25 mg by mouth every morning     hydroxychloroquine (PLAQUENIL) 200 MG tablet Take 200 mg by mouth daily     aspirin 325 MG EC tablet Take 325 mg by mouth daily.  folic acid (FOLVITE) 185 MCG tablet Take 800 mcg by mouth daily.  Cholecalciferol (VITAMIN D PO) Take 1,000 mg by mouth daily     Multiple Vitamins-Minerals (MULTIVITAMIN PO) Take 1 tablet by mouth daily      Meds to Beds:No    Assessment/Plan:  - Medication reconciliation completed. Number of medications reviewed: 22    - Pt is taking medications as directed by discharging physician. Number of discrepancies: 1. Instructions per discharge list provided except per below documentation. Identified medication discrepancies/issues:   · Category 1 (0)  · Category 2 (0)  · Category 3 (0)  · Category 4 (1):  1. Removed the following medications due to pt no longer taking: Tums, coenzyme Q10, Flaxseed oil and Krill oil. - [facility] active medication list updated:  · Medications Added (0)  · Medications Removed (4):  Tums, coenzyme Q10, flaxseed oil and krill oil  · Medications Changed (1): Sinemet    - Identified Potential Medication Interactions: No clinically significant interactions identified via Lexicomp Interaction Analysis as category D or higher.    - Renal Dosing: No renal adjustments necessary.     Thank you,    Bella Jefferson, PharmD, [address]  Phone: [phone] or [phone], option [address] Only    TCM Call Made?: Yes  [facility] Select Patient?: Yes  Total # of Interventions Recommended: 1 -   - Updated Order #: 1  Total # Interventions Accepted: 1  Intervention Severity:   - Level 1 Intervention Present?: No   - Level 2 #: 0   - Level 3 #: 0  Outreach Status: Review Complete  Care Coordinator Outreach to Patient?: No  Provider Contacted?: No  Time Spent (min): 20

## 2018-02-11 PROBLEM — R41.82 ALTERED MENTAL STATUS: Status: ACTIVE | Noted: 2018-01-01

## 2018-03-12 NOTE — CARE COORDINATION
7 days and will try to schedule a visit for the patient within 2 weeks.           Future Appointments  Date Time Provider Brittany Liv   4/30/2018 10:20 AM Deb Hancock MD [address] RN

## 2018-03-19 PROBLEM — A41.9 SEPSIS (HCC): Status: ACTIVE | Noted: 2018-01-01

## 2018-03-19 NOTE — ED NOTES
Telepak monitor on-called floor's monitor tech and received confirmation of visible rhythm on monitor.       Angeles Martinez RN  03/19/18 [id number]

## 2018-03-19 NOTE — ED PROVIDER NOTES
encounter and vital signs as below have been reviewed by myself. BP (!) 154/84   Pulse 101   Temp 104 °F (40 °C) (Rectal)   Resp 25   Ht 5' (1.524 m)   Wt 125 lb (56.7 kg)   SpO2 94%   BMI 24.41 kg/m²   Oxygen Saturation Interpretation: ordered    The patients available past medical records and past encounters were reviewed. ------------------------------ ED COURSE/MEDICAL DECISION MAKING----------------------  Medications   cefepime (MAXIPIME) 2 g IVPB minibag (0 g Intravenous Stopped 3/19/18 0425)   acetaminophen (TYLENOL) suppository 650 mg (650 mg Rectal Given 3/19/18 0330)             Medical Decision Making:        Re-Evaluations:             Re-evaluation. Patient reevaluated and much improved. Patient now speaking and oriented to person and place but not time. Patient vital signs have improved. Patient and family made aware of diagnosis and admission. He reported to me that Balbuena was misplaced the day before. . Balbuena is noted and is flowing. Patient reporting no abdominal pain. She reports no chest pain or difficulty breathing. Consultations:          Call placed to PCP       Critical Care: This patient's ED course included: a personal history and physicial eaxmination    This patient has been closely monitored during their ED course. Counseling: The emergency provider has spoken with the patient and discussed todays results, in addition to providing specific details for the plan of care and counseling regarding the diagnosis and prognosis. Questions are answered at this time and they are agreeable with the plan.       --------------------------------- IMPRESSION AND DISPOSITION ---------------------------------    IMPRESSION  1. Sepsis, due to unspecified organism [facility])        DISPOSITION  Disposition: admit To monitored bed  Patient condition is fair        NOTE: This report was transcribed using voice recognition software.  Every effort was made to ensure accuracy;

## 2018-03-19 NOTE — CONSULTS
SURGERY  02/19/2016    SIGMOID  RESECTION    RECTOCELE REPAIR         Medications Prior to Admission:    Prescriptions Prior to Admission: Calcium Carb-Cholecalciferol (CALCIUM-VITAMIN D) 500-200 MG-UNIT per tablet, Take 1 tablet by mouth 2 times daily  potassium chloride (KLOR-CON M) 20 MEQ extended release tablet, Take 20 mEq by mouth 3 times daily  predniSONE (DELTASONE) 20 MG tablet, Take 20 mg by mouth daily  vitamin B-12 (CYANOCOBALAMIN) 1000 MCG tablet, Take 1,000 mcg by mouth daily  DULoxetine (CYMBALTA) 60 MG extended release capsule, Take 1 capsule by mouth daily  amLODIPine (NORVASC) 5 MG tablet, Take 5 mg by mouth daily  traMADol (ULTRAM) 50 MG tablet, Take 1 tablet by mouth every 6 hours as needed for Pain . carbidopa-levodopa (SINEMET)  MG per tablet, Take 1 tablet by mouth 3 times daily  ursodiol (ACTIGALL) 300 MG capsule, Take 300 mg by mouth daily  atenolol (TENORMIN) 25 MG tablet, Take 25 mg by mouth every morning   hydroxychloroquine (PLAQUENIL) 200 MG tablet, Take 200 mg by mouth daily   aspirin 325 MG EC tablet, Take 325 mg by mouth daily. folic acid (FOLVITE) 675 MCG tablet, Take 800 mcg by mouth daily. Multiple Vitamins-Minerals (MULTIVITAMIN PO), Take 1 tablet by mouth daily     Allergies:    Etanercept; Flagyl [metronidazole]; Tofacitinib; Aricept [donepezil hcl]; Codeine; Levaquin [levofloxacin]; Other; Sulfa antibiotics; and Vicodin [hydrocodone-acetaminophen]    Social History:    reports that she has never smoked. She has never used smokeless tobacco. She reports that she does not drink alcohol or use drugs. Family History:   Non-contributory to this Urological problem  family history is not on file.     REVIEW OF SYSTEMS:  Respiratory: negative for cough and hemoptysis  Cardiovascular: negative for chest pain and dyspnea  Gastrointestinal: negative for abdominal pain, diarrhea, nausea and vomiting  Derm: negative for rash and skin lesion(s)  Neurological: negative for

## 2018-03-20 NOTE — PROGRESS NOTES
Occupational Therapy  OCCUPATIONAL THERAPY INITIAL EVALUATION      Date:3/20/2018  Patient Name: Nahomy Mauro  MRN: [id number]  : 1945  Room: [address] [place] [address]     Evaluating OT: Robbie Farley OTR/L #[id number]      Recommended Adaptive Equipment: tbd   AM-PAC Daily Activity Raw Score: 15/24    Diagnosis: sepsis    Past Medical History:   Past Medical History:   Diagnosis Date    Arrhythmia     Bladder disorder, other     over active bladder    Fibromyalgia     H/O mammogram 2016    Shobha-negative    Nausea & vomiting     Osteoarthritis     PONV (postoperative nausea and vomiting)     Rheumatoid arthritis (HCC)       Precautions: Fall risk (posterior lean), O2     Home Living: Pt lives with  in a 1 story home with 2+4 ERVIN and 2 hand rails    Bathroom setup: tub/shower combo with seat  Equipment owned: rollator, shower chair    Prior Level of Function: assist with ADLs; assist with IADLs; ambulated with rollator  Driving: no  Occupation: na    Pain Level: pt c/o 10 R shoulder pain this session ; pt reports receiving pain medication recently to address pain    Cognition: oriented x 4; follows 1 -2 step directions.    fair - Problem solving skills  Fair- Memory   fair  - Sequencing   poor safety  Additional comments:  Pt demonstrating inattention during session    Sensory:   Hearing: wfl  Vision: wfl    Glasses: yes [x] no [] reading []      UE Assessment:  Hand Dominance: Right [x]  Left []     Strength ROM Additional Info:    RUE   3+/5 proximal  4-/5 distal Shoulder limited  Elbow wfl good  and wfl FMC/dexterity noted during ADL tasks     LUE 4-/5 wfl good  and wfk FMC/dexterity noted during ADL tasks   Sensation: wfl  Tone: wfl  Edema:mild B LE edema noted    Functional Assessment:   Initial Status  Comments   Feeding  Set-up    Grooming  Min A    Upper Body Dressing Min A     Lower Body Dressing Max A    Bathing NT    Toileting  Mod A Pt required assistance with hygiene and clothing

## 2018-03-20 NOTE — PROGRESS NOTES
(36.3 °C) (Oral)   Resp 16   Ht 5' (1.524 m)   Wt 125 lb (56.7 kg)   SpO2 95%   BMI 24.41 kg/m²     Lab Results   Component Value Date    WBC 13.3 (H) 03/19/2018    HGB 12.3 03/19/2018    HCT 39.3 03/19/2018    MCV 94.7 03/19/2018     03/19/2018       Lab Results   Component Value Date    CREATININE 0.8 03/19/2018       No results found for: PSA        PHYSICAL EXAMINATION:  Skin dry, without rashes  Respirations non-labored, intact  Abdomen soft, non-tender, non-distended  Alert and oriented  Balbuena draining clear urine, scant debris      ASSESSMENT AND PLAN:  1.  Hx of OAB with chronic catheter x 2 years presents with likely CAUTI with recent catheter change  -C&S pending  -continue IV abx  -recommend catheter change every 4 weeks  -Renal US pending to ensure no hydronephrosis or stones (CT January was negative)    We will follow       Electronically signed by Fwan Broderick MD on 3/20/2018 at 5:36 AM

## 2018-03-20 NOTE — PROGRESS NOTES
Subjective:  68-year-old woman seen on [facility] 82 extension earlier this morning  She was admitted with sepsis  Feels much better  Afebrile  Oral intake better  Mental status back at baseline  Objective:    /78   Pulse 54   Temp 97.1 °F (36.2 °C) (Temporal)   Resp 16   Ht 5' (1.524 m)   Wt 125 lb (56.7 kg)   SpO2 93%   BMI 24.41 kg/m²   Alert oriented  No distress  Oral mucosa moist  Neck supple  Heart:  RRR, no murmurs, gallops, or rubs.   Lungs:  CTA bilaterally, no wheeze, rales or rhonchi  Abd: bowel sounds present, nontender, nondistended, no masses  Extrem:  No clubbing, cyanosis, or edema    Data reviewed    Assessment:  Sepsis syndrome  Urinary tract infection with lactic acidosis  Metabolic encephalopathy from sepsis  Multiple present and past comorbidities as listed below  Overall much improved  Patient Active Problem List   Diagnosis    Senile osteoporosis    Generalized osteoarthritis    Coxitis    Parkinson disease ([address]    Palpitations    Colitis    Lumbosacral spondylosis    Bursitis of right hip    Primary osteoarthritis of left knee    Memory impairment    Intractable back pain    Acute midline low back pain without sciatica    Closed compression fracture of L4 lumbar vertebra (HCC)    Closed compression fracture of thoracic vertebra (HCC)    Altered mental status    Sepsis ([address]       Plan:  Discontinue IV fluids  Discontinue cefepime  Continue Zosyn pending urine cultures  Leave the Balbuena catheter in  Ambulate  PTOT consults        Kassie Sam  11:52 AM  3/20/2018

## 2018-03-21 NOTE — PROGRESS NOTES
[facility] PROGRESS NOTE                                                                       3/21/2018        CHIEF UROLOGIC COMPLAINT: UA retention, UTI     HISTORY OF PRESENT ILLNESS:  Patient without new complaints. Feel much better. Denies fevers/chills. US reviewed with patient this AM    REVIEW OF SYSTEMS:   CONSTITUTIONAL: negative  HEENT: negative  HEMATOLOGIC: negative  ENDOCRINE: negative  RESPIRATORY: negative  CV: negative  GI: negative  NEURO: negative  ORTHOPEDICS: negative  PSYCHIATRIC: negative  : as above    PAST FAMILY HISTORY:  History reviewed. No pertinent family history.   PAST SOCIAL HISTORY:    Social History     Social History    Marital status:      Spouse name: N/A    Number of children: N/A    Years of education: N/A     Social History Main Topics    Smoking status: Never Smoker    Smokeless tobacco: Never Used    Alcohol use No    Drug use: No    Sexual activity: Not Asked     Other Topics Concern    None     Social History Narrative    None       Scheduled Meds:   sodium chloride flush  10 mL Intravenous 2 times per day    atenolol  25 mg Oral QAM    hydroxychloroquine  200 mg Oral Daily    aspirin  325 mg Oral Daily    folic acid  1 mg Oral Daily    ursodiol  300 mg Oral Daily    carbidopa-levodopa  1 tablet Oral TID    amLODIPine  5 mg Oral Daily    DULoxetine  60 mg Oral Daily    calcium-vitamin D  1 tablet Oral BID    potassium chloride  20 mEq Oral TID    predniSONE  20 mg Oral Daily    vitamin B-12  1,000 mcg Oral Daily    piperacillin-tazobactam  3.375 g Intravenous Q8H    enoxaparin  40 mg Subcutaneous Daily     Continuous Infusions:  PRN Meds:.sodium chloride flush, acetaminophen, traMADol, acetaminophen    BP (!) 144/88   Pulse 58   Temp 97.5 °F (36.4 °C) (Temporal)   Resp 14   Ht 5' (1.524 m)   Wt 125 lb (56.7 kg) Dmitri Villanueva MD on 3/21/2018 at 6:24 AM

## 2018-03-21 NOTE — PROGRESS NOTES
Subjective:  68-year-old woman seen on [facility] 82 extension earlier this morning  She was admitted with sepsis  Feels much better  Afebrile  Oral intake better  Mental status back at baseline  Objective:    /78   Pulse 61   Temp 97.2 °F (36.2 °C) (Temporal)   Resp 16   Ht 5' (1.524 m)   Wt 125 lb (56.7 kg)   SpO2 95%   BMI 24.41 kg/m²   Alert oriented  No distress  Oral mucosa moist  Neck supple  Heart:  RRR, no murmurs, gallops, or rubs.   Lungs:  CTA bilaterally, no wheeze, rales or rhonchi  Abd: bowel sounds present, nontender, nondistended, no masses  Extrem:  No clubbing, cyanosis, or edema    Data reviewed  Blood cultures actually are negative  Urine showing Proteus  Sensitivities pending  Assessment:  Sepsis syndrome  Urinary tract infection with lactic acidosis  Metabolic encephalopathy from sepsis  Multiple present and past comorbidities as listed below  Overall much improved  Patient Active Problem List   Diagnosis    Senile osteoporosis    Generalized osteoarthritis    Coxitis    Parkinson disease (HCC)    Palpitations    Colitis    Lumbosacral spondylosis    Bursitis of right hip    Primary osteoarthritis of left knee    Memory impairment    Intractable back pain    Acute midline low back pain without sciatica    Closed compression fracture of L4 lumbar vertebra (HCC)    Closed compression fracture of thoracic vertebra (HCC)    Altered mental status    Sepsis ([address]       Plan:  Discontinued IV fluids  Discontinued cefepime  Continue Zosyn pending urine cultures  Leave the Balbuena catheter in  Ambulate  PTOT consults        Shekhar Bajwa  10:32 AM  3/21/2018

## 2018-03-22 NOTE — PROGRESS NOTES
ID Progress Note      Brief Interval History    Subjective: The patient is awake and alert. Tolerating medications. Reports no side effects. Afebrile. 10 ROS otherwise negative unless otherwise specified above. Objective:    Vitals:    03/22/18 0750   BP: 138/72   Pulse: 70   Resp: 16   Temp: 97.6 °F (36.4 °C)   SpO2: 96%     VENT SETTINGS:      General Appearance:    Awake, alert , no acute distress. HEENT:    Normocephalic,PERRL,neck supple, no JVD, mucosa moist, no thrush   Lungs:     Clear to auscultation bilaterally, no wheeze , crackles   Heart:    Regular rate and rhythm, no murmur   Abdomen:     Soft, non-tender, not distended  bowel sounds present,   Extremities:   No edema,no open wound,no erythema, non  tender   Skin:   no rashes or lesions     Labs:  Recent Labs      03/20/18   0616  03/21/18   0632  03/22/18   0546   WBC  11.9*  9.5  9.5   RBC  3.57  3.97  4.40   HGB  10.4*  11.4*  12.6   HCT  34.2  36.7  40.8   MCV  95.8  92.4  92.7   MCH  29.1  28.7  28.6   MCHC  30.4*  31.1*  30.9*   RDW  15.6*  15.1*  14.9   PLT  251  315  316   MPV  10.1  10.5  10.5     CMP:    Lab Results   Component Value Date     03/19/2018    K 3.7 03/19/2018     03/19/2018    CO2 22 03/19/2018    BUN 20 03/19/2018    CREATININE 0.8 03/19/2018    GFRAA >60 03/19/2018    LABGLOM >60 03/19/2018    GLUCOSE 98 03/19/2018    PROT 5.4 03/19/2018    LABALBU 2.9 03/19/2018    CALCIUM 8.5 03/19/2018    BILITOT 0.4 03/19/2018    ALKPHOS 73 03/19/2018    AST 30 03/19/2018    ALT 5 03/19/2018          Microbiology :  No results for input(s): BC in the last 72 hours. No results for input(s): Loura Lasso in the last 72 hours. No results for input(s): LABURIN in the last 72 hours. No results for input(s): CULTRESP in the last 72 hours. No results for input(s): WNDABS in the last 72 hours. Radiology :  US RETROPERITONEAL COMPLETE   Final Result   1.  No evidence of hydronephrosis, perinephric collections, renal   calculi or solid renal masses. 2. Urinary bladder not imaged or evaluated. XR CHEST PORTABLE   Final Result   Streaky opacities in the lung bases, suggestive of atelectasis. URINARY CATHETER OUTPUT (Balbuena):  Urethral Catheter Double-lumen 16 fr-Output (mL): 550 mL  [REMOVED] Urethral Catheter-Output (mL): 750 mL    ASSESSMENT:  1. Severe sepsis secondary to UTI. 2.  Pyelonephritis. 3.  Bacteremia secondary to pyelonephritis from a traumatic Balbuena  insertion.     PLAN:  1. The patient has recent urine culture positive with Proteus mirabilis,  which was pansensivite and we will change Zosyn to ceftriaxone. 2.  Repeat blood cultures.   3.  D/C on cefuroxime for 14 days, extensive allergy history          Electronically signed by Rene Sweeney MD on 3/22/2018 at 11:25 AM

## 2018-03-22 NOTE — CONSULTS
pertinent  negative and positive has been included in HPI. Rest is noncontributory. PHYSICAL EXAMINATION:  VITALS:  Noted. Mentioned in HPI. GENERAL:  The patient is alert and oriented x3, not in acute distress. HEENT:  Atraumatic and normocephalic. PERRLA. EOMI. RESPIRATORY:  Air entry bilaterally equal.  No wheezes or crackles. CARDIOVASCULAR:  S1 and S2 normal.  No murmur, rubs, or gallops. ABDOMEN:  Soft, nontender, and nondistended. Bowel sounds present in all  four quadrants. No CVA tenderness. EXTREMITIES:  No pedal edema. NEUROLOGIC:  Grossly intact. LABS AND IMAGING:  Reviewed. Mentioned in HPI. ASSESSMENT:  1. Severe sepsis secondary to UTI. 2.  Pyelonephritis. 3.  Bacteremia secondary to pyelonephritis from a traumatic Balbuena  insertion. PLAN:  1. The patient has recent urine culture positive with Proteus mirabilis,  which was pansensivite and we will change Zosyn to ceftriaxone. 2.  Repeat blood cultures. 3.  As the patient is clinically improving, in next 24 hours can plan for  her discharge. Thank you for your consult, please call for any questions.         María Wick MD    D: 03/21/2018 22:57:07       T: 03/22/2018 0:45:34     [id number]  Job#: [id number]     Doc#: [id number]    CC:

## 2018-03-22 NOTE — PLAN OF CARE
Problem: Urinary Elimination:  Goal: Signs and symptoms of infection will decrease  Signs and symptoms of infection will decrease   Outcome: Met This Shift

## 2018-03-22 NOTE — PROGRESS NOTES
Subjective:  38-year-old woman seen on [facility] 82 extension earlier this morning  She was admitted with sepsis  Feels much better  Afebrile  Oral intake better  Mental status back at baseline  Objective:    /72   Pulse 70   Temp 97.6 °F (36.4 °C) (Temporal)   Resp 16   Ht 5' (1.524 m)   Wt 125 lb (56.7 kg)   SpO2 96%   BMI 24.41 kg/m²   Alert oriented  No distress  Oral mucosa moist  Neck supple  Heart:  RRR, no murmurs, gallops, or rubs.   Lungs:  CTA bilaterally, no wheeze, rales or rhonchi  Abd: bowel sounds present, nontender, nondistended, no masses  Extrem:  No clubbing, cyanosis, or edema    Data reviewed  Blood cultures actually are negative  Urine showing Proteus  Sensitivities pending  Assessment:  Sepsis syndrome  Urinary tract infection with lactic acidosis  Metabolic encephalopathy from sepsis  Multiple present and past comorbidities as listed below  Overall much improved  Patient Active Problem List   Diagnosis    Senile osteoporosis    Generalized osteoarthritis    Coxitis    Parkinson disease (HCC)    Palpitations    Colitis    Lumbosacral spondylosis    Bursitis of right hip    Primary osteoarthritis of left knee    Memory impairment    Intractable back pain    Acute midline low back pain without sciatica    Closed compression fracture of L4 lumbar vertebra (HCC)    Closed compression fracture of thoracic vertebra (HCC)    Altered mental status    Sepsis ([address]       Plan:  Dc home if ok with ID  Leave the Balbuena catheter in          Meredith Crimes  11:12 AM  3/22/2018

## 2018-03-23 NOTE — CARE COORDINATION
[facility] Transitions Initial Follow Up Call    Call within 2 business days of discharge: Yes    Patient: Jose Manuel Parada Patient : 1945   MRN: <[id number]>  Reason for Admission: AVS Diagnosis:  Sepsis  Discharge Date: 3/22/18 RARS: [facility] Risk Score: 19.5     Spoke with:  Patient has agreed for patient's spouse to participate on this phone call. Patient's spouse reports patient's joseph is intact and draining clear yellow urine. Patient's spouse reports that he will   schedule an appointment for the patient to follow up with Dr. Sacha Mejia when the joseph catheter is due to be changed. Patient's spouse reports that the patient is eating, drinking liquids, and \"glad to be home\". Spouse reports that patient is fatigued, denies any confusion. Patient's spouse is encouraging the patient to drink plenty of water. RN CTC provided emotional support to the patient and patient's spouse. RN CTC instructed patient and patient's spouse to call with any questions or concerns;  RN CTC contact information provided. Facility:  [facility]    Non-face-to-face services provided:  Obtained and reviewed discharge summary and/or continuity of care documents    Care Transitions 24 Hour Call    Do you have any ongoing symptoms?:  No; denies fever, chills  Do you have a copy of your discharge instructions?:  Yes  Do you have all of your prescriptions and are they filled?:  Yes  Have you been contacted by a [facility] Pharmacist?:  No  Have you scheduled your follow up appointment?:  No  Were you discharged with any Home Care or Post Acute Services:  Yes  Post Acute Services:  Home Health (Comment: [employer]; plans to resume services)  Do you feel like you have everything you need to keep you well at home?:  Yes    BOBBY NUNEZ notified Jonathon Kumar, Practice Manager, at Dr. Shady Webster office that patient will need to be called and scheduled for a   hospital follow up/TCM visit within 7 days of hospital discharge on 3/22/18.

## 2018-03-24 NOTE — DISCHARGE SUMMARY
Physician Discharge Summary     Patient ID:  Sonny Bowden  [id number]  74 y.o.  1945    Admit date: 3/19/2018    Discharge date and time: 3/22/2018  2:02 PM     Admission Diagnoses: Sepsis ([address] [A41.9]  Sepsis ([address] [A41.9]    Discharge Diagnoses:   Gram-negative sepsis Proteus  Catheter associated urinary tract infection  Multiple present and past comorbidities as listed below  Patient Active Problem List   Diagnosis    Senile osteoporosis    Generalized osteoarthritis    Coxitis    Parkinson disease ([address]    Palpitations    Colitis    Lumbosacral spondylosis    Bursitis of right hip    Primary osteoarthritis of left knee    Memory impairment    Intractable back pain    Acute midline low back pain without sciatica    Closed compression fracture of L4 lumbar vertebra (HCC)    Closed compression fracture of thoracic vertebra (HCC)    Altered mental status    Sepsis ([facility] [facility]       Consults: urology ID    Procedures:     Hospital Course:    52-year-old  woman admitted with fever and chills  She had a Balbuena catheter changed a day prior to admission  Found to be septic  Admitted for further management  Urine and blood cultures showed Proteus  Responded well to IV fluids and IV antibiotics  Discharged home in stable condition  Discharge Exam:  See progress note from today    Disposition: home with home care    Patient Instructions:   Discharge Medication List as of 3/22/2018 12:17 PM      START taking these medications    Details   cefUROXime (CEFTIN) 500 MG tablet Take 1 tablet by mouth 2 times daily for 11 days, Disp-22 tablet, R-0Normal         CONTINUE these medications which have NOT CHANGED    Details   Calcium Carb-Cholecalciferol (CALCIUM-VITAMIN D) 500-200 MG-UNIT per tablet Take 1 tablet by mouth 2 times dailyHistorical Med      potassium chloride (KLOR-CON M) 20 MEQ extended release tablet Take 20 mEq by mouth 3 times dailyHistorical Med      predniSONE (DELTASONE) 20 MG tablet Take 20 mg by mouth dailyHistorical Med      vitamin B-12 (CYANOCOBALAMIN) 1000 MCG tablet Take 1,000 mcg by mouth dailyHistorical Med      DULoxetine (CYMBALTA) 60 MG extended release capsule Take 1 capsule by mouth daily, Disp-90 capsule, R-1Normal      amLODIPine (NORVASC) 5 MG tablet Take 5 mg by mouth dailyHistorical Med      traMADol (ULTRAM) 50 MG tablet Take 1 tablet by mouth every 6 hours as needed for Pain ., Disp-90 tablet, R-0Print      carbidopa-levodopa (SINEMET)  MG per tablet Take 1 tablet by mouth 3 times daily, Disp-270 tablet, R-3Normal      ursodiol (ACTIGALL) 300 MG capsule Take 300 mg by mouth dailyHistorical Med      atenolol (TENORMIN) 25 MG tablet Take 25 mg by mouth every morning Historical Med      hydroxychloroquine (PLAQUENIL) 200 MG tablet Take 200 mg by mouth daily Historical Med      aspirin 325 MG EC tablet Take 325 mg by mouth daily. Historical Med      folic acid (FOLVITE) 631 MCG tablet Take 800 mcg by mouth daily. Historical Med      Multiple Vitamins-Minerals (MULTIVITAMIN PO) Take 1 tablet by mouth daily Historical Med           Activity: as tolerated  Diet: General    Follow-up with PCP    Note that over 30 minutes was spent in preparing discharge papers, discussing discharge with patient, medication review, etc.    Signed:  Kylie Dye MD  3/24/2018  6:01 PM

## 2018-07-05 NOTE — CONSULTS
that includes Hysterectomy; Rectocele repair; other surgical history; Fixation Kyphoplasty (08/06/2014); Colonoscopy; eye surgery (Bilateral, 1/2014); Cystoscopy (4/13/15); Rectal surgery (02/19/2016); doppler echocardiography; Knee arthroscopy; and Fixation Kyphoplasty (12/13/2017). Home Medications:    Prior to Admission medications    Medication Sig Start Date End Date Taking? Authorizing Provider   carbidopa-levodopa (SINEMET)  MG per tablet TAKE 1 TABLET THREE TIMES A DAY 6/11/18   Isabela Diaz MD   atenolol (TENORMIN) 25 MG tablet Take 1 tablet by mouth every morning 6/11/18   Isabela Diaz MD   traMADol (ULTRAM) 50 MG tablet Take 1 tablet by mouth nightly for 30 days. . 6/5/18 7/5/18  Isabela Diaz MD   Handicap Placard MISC 1 applicator by Does not apply route once for 1 dose 5/15/18 5/15/18  Isabela Diaz MD   Calcium Carb-Cholecalciferol (CALCIUM-VITAMIN D) 500-200 MG-UNIT per tablet Take 1 tablet by mouth 2 times daily    Historical Provider, MD   potassium chloride (KLOR-CON M) 20 MEQ extended release tablet Take 20 mEq by mouth 3 times daily    Historical Provider, MD   predniSONE (DELTASONE) 20 MG tablet Take 20 mg by mouth daily    Historical Provider, MD   vitamin B-12 (CYANOCOBALAMIN) 1000 MCG tablet Take 1,000 mcg by mouth daily    Historical Provider, MD   DULoxetine (CYMBALTA) 60 MG extended release capsule Take 1 capsule by mouth daily 3/13/18 6/11/18  Isabela Diaz MD   amLODIPine (NORVASC) 5 MG tablet Take 5 mg by mouth daily    Historical Provider, MD   traMADol (ULTRAM) 50 MG tablet Take 1 tablet by mouth every 6 hours as needed for Pain . 12/14/17 12/14/18  LOTTIE Nguyen   ursodiol (ACTIGALL) 300 MG capsule Take 300 mg by mouth daily    Historical Provider, MD   hydroxychloroquine (PLAQUENIL) 200 MG tablet Take 200 mg by mouth daily     Historical Provider, MD   aspirin 325 MG EC tablet Take 325 mg by mouth daily.     Historical Provider, MD   folic acid (FOLVITE) 621

## 2018-07-05 NOTE — PROGRESS NOTES
Date: 7/5/2018    Time: 1:34 AM    Patient Placed On BIPAP/CPAP/ Non-Invasive Ventilation? Yes    If no must comment. Facial area red/color change? No           If YES are Blister/Lesion present? No   If yes must notify nursing staff  BIPAP/CPAP skin barrier?   Yes    Skin barrier type:Liquicel       Comments:        Wil Guadalupe, RRT

## 2018-07-05 NOTE — H&P
tablet, Take 1 tablet by mouth nightly for 30 days. .  Handicap Placard MISC, 1 applicator by Does not apply route once for 1 dose  Calcium Carb-Cholecalciferol (CALCIUM-VITAMIN D) 500-200 MG-UNIT per tablet, Take 1 tablet by mouth 2 times daily  potassium chloride (KLOR-CON M) 20 MEQ extended release tablet, Take 20 mEq by mouth 3 times daily  predniSONE (DELTASONE) 20 MG tablet, Take 20 mg by mouth daily  vitamin B-12 (CYANOCOBALAMIN) 1000 MCG tablet, Take 1,000 mcg by mouth daily  DULoxetine (CYMBALTA) 60 MG extended release capsule, Take 1 capsule by mouth daily  amLODIPine (NORVASC) 5 MG tablet, Take 5 mg by mouth daily  traMADol (ULTRAM) 50 MG tablet, Take 1 tablet by mouth every 6 hours as needed for Pain . ursodiol (ACTIGALL) 300 MG capsule, Take 300 mg by mouth daily  hydroxychloroquine (PLAQUENIL) 200 MG tablet, Take 200 mg by mouth daily   aspirin 325 MG EC tablet, Take 325 mg by mouth daily. folic acid (FOLVITE) 971 MCG tablet, Take 800 mcg by mouth daily. Multiple Vitamins-Minerals (MULTIVITAMIN PO), Take 1 tablet by mouth daily     Allergies:    Etanercept; Flagyl [metronidazole]; Tofacitinib; Aricept [donepezil hcl]; Codeine; Levaquin [levofloxacin]; Other; Sulfa antibiotics; and Vicodin [hydrocodone-acetaminophen]    Social History:    reports that she has never smoked. She has never used smokeless tobacco. She reports that she does not drink alcohol or use drugs. Family History:   family history is not on file. REVIEW OF SYSTEMS:  As above in the HPI, otherwise negative    PHYSICAL EXAM:    Vitals:  /72   Pulse 78   Temp 98.4 °F (36.9 °C) (Temporal)   Resp 18   Ht 5' (1.524 m)   Wt 132 lb 4.8 oz (60 kg)   SpO2 94%   BMI 25.84 kg/m²     General:  Awake, alert, oriented X 3. Well developed,malnourished , well groomed. No apparent distress. Looks somewhat pale  HEENT:  Normocephalic, atraumatic. Pupils equal, round, reactive to light. No scleral icterus.   No conjunctival injection. Normal lips, teeth, and gums. No nasal discharge. Neck:  Supple  Heart:  RRR, no murmurs, gallops, rubs  Lungs:  Scattered rales  Abdomen:   Bowel sounds present, soft, nontender, no masses, no organomegaly, no peritoneal signs  Extremities:  No clubbing, cyanosis, or edema  Skin:  Warm and dry, no open lesions or rash  Neuro:  Cranial nerves 2-12 intact, no focal deficits  Generalized muscle atrophy noted to be symmetrical  Breast: deferred  Rectal: deferred  Genitalia:  deferred    LABS:  Data reviewed in detail      ASSESSMENT:    Sepsis from catheter associated recurrent urinary tract infection  Doubt CHF  Rule out underlying atypical pulmonary infection  Parkinson's disease  Malnutrition  Rheumatoid arthritis on chronic steroid therapy  Multiple comorbidities        PLAN:  Respiratory panel  CT angiogram of the chest  IV antibiotics per ID  Pulmonary and cardiology consults  DVT prophylaxis  Alejandra Sterling  2:13 PM  7/5/2018  He will

## 2018-07-05 NOTE — ED PROVIDER NOTES
HPI:  7/5/18, Time: 1:24 AM         Allyson Moctezuma is a 74 y.o. female presenting to the ED for Respiratory distress, beginning Short time ago. The complaint has been persistent, severe in severity, and worsened by nothing. Patient per report was having trouble breathing at home. Patient was hypoxic at home. Patient was placed on CPAP by EMS and was given breathing treatments. There is no history of fever there is a history of vomiting. Patient unable to give complete history due to her condition. ROS:   Pertinent positives and negatives are stated within HPI, all other systems reviewed and are negative.  --------------------------------------------- PAST HISTORY ---------------------------------------------  Past Medical History:  has a past medical history of Arrhythmia; Bladder disorder, other; Fibromyalgia; H/O mammogram; Nausea & vomiting; Osteoarthritis; PONV (postoperative nausea and vomiting); and Rheumatoid arthritis ([facility] [address] Past Surgical History:  has a past surgical history that includes Hysterectomy; Rectocele repair; other surgical history; Fixation Kyphoplasty (08/06/2014); Colonoscopy; eye surgery (Bilateral, 1/2014); Cystoscopy (4/13/15); Rectal surgery (02/19/2016); doppler echocardiography; Knee arthroscopy; and Fixation Kyphoplasty (12/13/2017). Social History:  reports that she has never smoked. She has never used smokeless tobacco. She reports that she does not drink alcohol or use drugs. Family History: family history is not on file. The patients home medications have been reviewed. Allergies: Etanercept; Flagyl [metronidazole]; Tofacitinib; Aricept [donepezil hcl]; Codeine; Levaquin [levofloxacin]; Other; Sulfa antibiotics; and Vicodin [hydrocodone-acetaminophen]    ---------------------------------------------------PHYSICAL EXAM--------------------------------------    Constitutional/General: Alert and Awake. Patient is in moderate to severe distress.   Head:

## 2018-07-05 NOTE — PROGRESS NOTES
Pt down for CTA of chest, techs called nursing unit, stated pt's IV was infiltrated, this RN went down to CT to insert new IV, new IV site obtained. CT techs expressed there concern that pt's veins would not with stand the contrast that needs to be injected, Dr. Vilma Roberto made aware of situation, stated to check with DR. Badillo to see if he still wants CTA done.   Spoke with Dr. Scottie Maki and stated for pt to have midline placed and have CTA done after

## 2018-07-05 NOTE — CARE COORDINATION
Patients daughters from out of town are here now, with . SW spoke with all at their request.  They would like for patient to go to rehab on discharge if possible. Will need therapy evals. Sticky note left for . [facility] Referral made to Jose Hawk at [facility].

## 2018-07-05 NOTE — CONSULTS
tablet Take 800 mcg by mouth daily. Historical Provider, MD   Multiple Vitamins-Minerals (MULTIVITAMIN PO) Take 1 tablet by mouth daily     Historical Provider, MD       ALLERGIES:  Etanercept; Flagyl [metronidazole]; Tofacitinib; Aricept [donepezil hcl]; Codeine; Levaquin [levofloxacin]; Other; Sulfa antibiotics; and Vicodin [hydrocodone-acetaminophen]       REVIEW OF SYSTEMS:   Constitutional: No unanticipated weight loss. There's been no change in energy level, sleep pattern or activity level. No fever chills or rigors. Eyes: No visual changes or diplopia. No scleral icterus. ENT: No Headaches, hearing loss or vertigo. No mouth sores or sore throat. No change in taste or smell. Cardiovascular: No chest discomfort, dyspnea on exertion, palpitations, loss of consciousness, no phlebitis, no claudication. Respiratory: No cough, wheezing or sputum production. No hemoptysis, pleuritic pain. Gastrointestinal: No abdominal pain, appetite loss, blood in stools. No hematemesis  Genitourinary: No dysuria, trouble voiding or hematuria. No nocturia. Musculoskeletal:  No gait disturbance, weakness or joint complaints. Integumentary: No rash or pruritis. Neurological: No headache, diplopia, change in muscle strength, numbness or tingling. No change in gait, balance, coordination, mood, affect, memory, mentation, behavior. Psychiatric: No anxiety or depression. Endocrine: No temperature intolerance. No excessive thirst, fluid intake, or urination. No tremor. Hematologic/Lymphatic: No abnormal bruising or bleedingor swollen lymph nodes. Allergic/Immunologic: No nasal congestion or hives.       OBJECTIVE:     PHYSICAL EXAM:   VITALS:   Patient Vitals for the past 8 hrs:   BP Temp Temp src Pulse Resp   07/05/18 1556 (!) 102/58 97.5 °F (36.4 °C) Temporal 60 16       Intake/Output Summary (Last 24 hours) at 07/05/18 1909  Last data filed at 07/05/18 1509   Gross per 24 hour   Intake              740 ml   Output 450 ml   Net              290 ml        CONSTITUTIONAL:   A&O x 3, NAD  SKIN:    Chronic brusing from long term steroids. RA changes  HEENT:    EOMI, MMM, No thrush  NECK:   No bruits, No JVP apprechiated  CV:     Sinus,  No Murmus,   PULMONARY:  Couse,  crackles  ABDOMEN:    Soft, non-tender. BS normal. No R/R/G  EXT:   No deformities or skin discoloration. + lower extremity edema, Stasis  PULSE:  Peripheral pulses present 1+   PSYCHIATRIC: Approprate  MS:   Muscle weakness, Atrophy  NEUROLOGIC:  Non focal exam.     DATA: IMAGING & TESTING:     LABORATORY TESTS:    CBC:   Lab Results   Component Value Date    WBC 18.8 2018    RBC 4.66 2018    HGB 13.4 2018    HCT 41.5 2018    MCV 89.1 2018    MCH 28.8 2018    MCHC 32.3 2018    RDW 16.5 2018     2018    MPV 11.7 2018     CMP:    Lab Results   Component Value Date     2018    K 3.9 2018     2018    CO2 17 2018    BUN 22 2018    CREATININE 0.7 2018    GFRAA >60 2018    LABGLOM >60 2018    GLUCOSE 163 2018    PROT 7.2 2018    LABALBU 3.5 2018    CALCIUM 9.5 2018    BILITOT 0.6 2018    ALKPHOS 141 2018    AST 69 2018    ALT 23 2018        PRO-BNP:   Lab Results   Component Value Date    PROBNP 1,713 (H) 2018    PROBNP 3,183 (H) 2016      ABGs:   Lab Results   Component Value Date    PH 7.424 2018    PO2 320.6 2018    PCO2 29.5 2018     Hemoglobin A1C: No components found for: HGBA1C    IMAGING:  Imaging tests were completed and reviewed and discussed radiology and primary care team and reveals   Xr Chest Portable    Result Date: 2018  Patient MRN: [id number] : 1945 Age:  74 years Gender: Female Order Date: 2018 1:30 AM Exam: XR CHEST PORTABLE Number of Images: 1 view Indication:   sob sob Comparison: 4/10/2018 Findings: The heart is enlarged.  The

## 2018-07-05 NOTE — CONSULTS
Inpatient Cardiology Consultation      Reason for Consult:  Heart failure    Consulting Physician: Dr. Yesenia Brenner    Requesting Physician:  Dr. Vilma Roberto    Date of Consultation: 7/5/2018    HISTORY OF PRESENT ILLNESS: Mrs. Kristin Corbett a 74year old  female not known to [facility] cardiology, past medical history includes rheumatoid arthritis, parkinson, HLD, HTN, UTI's, fibromyalgia, Kyphoplasty. 7/5/2018 patient present to the ER at [facility], EMS was called to her residence by her  for respiratory distress. Patient is somewhat confused, a poor historian admitting that she is not sure what happened over the last week however she does state that she has been lying in her bed for the last 4 days, has experienced multiple falls, states that she has not experienced chest discomfort of any kind, heart racing or palpitations, orthopnea or PND. On presentation to the emergency room was noted that her oxygen saturation was in the 80s was placed on CPAP therapy, a her lactic acid was elevated 6.7 was given a liter of IV fluids with resolution of her lactic acidosis. Further evaluation included an elevated proBNP level of 1713 with a prior proBNP of 3183 in 2016, her chest x-ray showed CHF pattern a CT of her chest is pending. As a result of her elevated white cell count (18) she was given Maxipime in the ER for suspected UTI. Cardiology consult for  congestive heart failure      Please note: past medical records were reviewed per electronic medical record (EMR) - see detailed reports under Past Medical/ Surgical History. Past Medical History:    1. Parkinson   2. RA -chronic steroid therapy  3. HLD   4. Previously seen by Dr. Nely Hollis unknown year and purpose  5. Overactive bladder  6. Fibromyalgia  7. C. diff  8. Chronic Balbuena catheter use, frequent UTIs  9. Close compression fracture of L4 status post kyphoplasty  10.  Hypertension    Medications Prior to admit:  Prior to Admission medications flush 0.9 % injection 10 mL, 10 mL, Intravenous, PRN  carbidopa-levodopa (SINEMET)  MG per tablet 1 tablet, 1 tablet, Oral, TID  amLODIPine (NORVASC) tablet 5 mg, 5 mg, Oral, Daily  aspirin EC tablet 325 mg, 325 mg, Oral, Daily  atenolol (TENORMIN) tablet 25 mg, 25 mg, Oral, QAM  DULoxetine (CYMBALTA) extended release capsule 60 mg, 60 mg, Oral, Daily  folic acid (FOLVITE) tablet 1 mg, 1 mg, Oral, Daily  hydroxychloroquine (PLAQUENIL) tablet 200 mg, 200 mg, Oral, Daily  potassium chloride (KLOR-CON M) extended release tablet 20 mEq, 20 mEq, Oral, TID  predniSONE (DELTASONE) tablet 20 mg, 20 mg, Oral, Daily  traMADol (ULTRAM) tablet 50 mg, 50 mg, Oral, Q6H PRN  ursodiol (ACTIGALL) capsule 300 mg, 300 mg, Oral, Daily  vitamin B-12 (CYANOCOBALAMIN) tablet 1,000 mcg, 1,000 mcg, Oral, Daily  enoxaparin (LOVENOX) injection 40 mg, 40 mg, Subcutaneous, Daily  cefepime (MAXIPIME) 1 g IVPB minibag, 1 g, Intravenous, Q12H  0.9 % sodium chloride infusion, , Intravenous, Continuous    Allergies:  Etanercept; Flagyl [metronidazole]; Tofacitinib; Aricept [donepezil hcl]; Codeine; Levaquin [levofloxacin]; Other; Sulfa antibiotics; and Vicodin [hydrocodone-acetaminophen]    Social History:    Tobacco: Lifelong nonsmoker  Sick: Denies use  Alcohol: Denies use  Activity: Patient currently resides at home with her elderly  who is also in poor health, she uses a wheelchair or a wheeled walker to ambulate. Family History: Noncontributory        REVIEW OF SYSTEMS:     · Constitutional: Denies night sweats, and positive for fevers, chills and fatigue  · HEENT: Denies headaches, nose bleeds, and blurred vision,oral pain, abscess or lesion. · Musculoskeletal: Denies pain to BLE with ambulation and edema to BLE. Positive for falls  · Neurological: Denies dizziness and lightheadedness, numbness and tingling  · Cardiovascular: Denies chest pain, palpitations, and feelings of heart racing.    · Respiratory: Denies orthopnea and PND  · Gastrointestinal: Denies heartburn, nausea/vomiting, diarrhea and constipation, black/bloody, and tarry stools. · Genitourinary: Denies dysuria and hematuria  · Hematologic: Denies excessive bruising or bleeding  · Lymphatic: Denies lumps and bumps to neck, axilla, breast, and groin  · Endocrine: Denies excessive thirst. Denies intolerance to hot and cold  · GYN: Postmenopausal state; Denies vaginal bleeding. · Psychiatric: Denies anxiety and depression. PHYSICAL EXAM:   /72   Pulse 78   Temp 98.4 °F (36.9 °C) (Temporal)   Resp 18   Ht 5' (1.524 m)   Wt 132 lb 4.8 oz (60 kg)   SpO2 94%   BMI 25.84 kg/m²   CONST:  Well developed, elderly  female, examined in room laying flat in bed without family present, who appears stated age. Awake, alert, cooperative, no apparent distress  HEENT:   Head- Normocephalic, atraumatic   Eyes- Conjunctivae pink, anicteric  Throat- Oral mucosa pink and moist  Neck-  No stridor, trachea midline, no jugular venous distention. No adenopathy , large amount of habitus noted  CHEST: Chest symmetrical and non-tender to palpation. No accessory muscle use or intercostal retractions  RESPIRATORY: Lung sounds - rales, decreased, occasional wheeze throughout fields   CARDIOVASCULAR:     No carotid bruit  Heart Ausculation- Regular rate and rhythm, 2/6 systolic murmur. No s3, s4 or rub   PV: No lower extremity edema. No varicosities. , no clubbing or cyanosis   ABDOMEN: Soft, non-tender to light palpation. Bowel sounds present. No palpable masses no organomegaly; no abdominal bruit  MS:poor muscle strength and tone. No atrophy or abnormal movements. : Deferred  SKIN: Warm and dry  venous stasis ulcers present bilateral lower extremities have the appearance of chronic wounds multiple areas of ecchymosis noted  NEURO / PSYCH: Oriented to person, place and time. Speech clear and appropriate. Follows all commands.  Pleasant affect confused to higher-level questioning admits amnesia for the last week    DATA:    ECG: Sinus rhythm, LVH  Tele strips: Sinus rhythm  Diagnostic:  XR CHEST PORTABLE   Final Result   Findings compatible with congestive heart failure. Findings compatible with atherosclerotic disease of the aorta. The chest appears to be worse in the interval                  CTA CHEST W CONTRAST    (Results Pending)         Intake/Output Summary (Last 24 hours) at 07/05/18 1451  Last data filed at 07/05/18 1446   Gross per 24 hour   Intake              300 ml   Output                0 ml   Net              300 ml       Labs:   CBC:   Recent Labs      07/05/18   0150   WBC  18.8*   HGB  13.4   HCT  41.5   PLT  223     BMP: Recent Labs      07/05/18   0150  07/05/18   0300   NA  139   --    K  6.4*  3.9   CO2  17*   --    BUN  22   --    CREATININE  0.7   --    LABGLOM  >60   --    CALCIUM  9.5   --      CARDIAC ENZYMES:  Recent Labs      07/05/18   0150   TROPONINI  0.02     FASTING LIPID PANEL:  Lab Results   Component Value Date    CHOL 296 07/06/2017     07/06/2017    LDLCALC 151 07/06/2017    TRIG 219 07/06/2017     LIVER PROFILE:  Recent Labs      07/05/18   0150   AST  69*   ALT  23   LABALBU  3.5     Impression plan per Dr. Marylene Blades  Electronically signed by TRACY Carlos CNP on 7/5/2018 at 2:51 PM   I independently performed an evaluation on the patient. I have reviewed the above documentation completed by the advance practitioner. Please see my additional contributions to the HPI, physical exam, assessment and medical decision making.     See accompanying documentation for full consult.     ASSESSMENT AND PLAN:  1. SOB/CHF:      Mildly elevated BNP. Congestion on CXR. Mild at worst volume overload. EKG suggests LVH with strain.      Echo.      For CTA chest.     Further recommendations based on evaluation and clinical course.     2. UTI: Per PCP.      3.  HTN: Observe.     4.Lipids: Statin.     5. RA     6. Sandy Austin,

## 2018-07-06 NOTE — PROGRESS NOTES
Component Value Date    PROBNP 1,713 (H) 2018    PROBNP 3,183 (H) 2016      ABGs:   Lab Results   Component Value Date    PH 7.424 2018    PO2 320.6 2018    PCO2 29.5 2018     Hemoglobin A1C: No components found for: HGBA1C    IMAGING:  Imaging tests were completed and reviewed and discussed radiology and primary care team and reveals   Xr Chest Portable    Result Date: 2018  Patient MRN: [id number] : 1945 Age:  74 years Gender: Female Order Date: 2018 1:30 AM Exam: XR CHEST PORTABLE Number of Images: 1 view Indication:   sob sob Comparison: 4/10/2018 Findings: The heart is enlarged. The lung fields demonstrate pulmonary vascular congestion and edema. The aorta is tortuous ectatic and calcified. Findings compatible with congestive heart failure. Findings compatible with atherosclerotic disease of the aorta. The chest appears to be worse in the interval     ECHOCARDIOGRAM:      Ejection fraction is visually estimated at 65-70%.   No regional wall motion abnormalities seen.   Moderate left ventricular concentric hypertrophy noted.  Marget Olmitz is doppler evidence of stage I diastolic dysfunction.   Normal right ventricle structure and function.   Mild aortic stenosis is present.   Physiologic and/or trace mitral regurgitation is present.   Mild to moderate tricuspid regurgitation.   Pulmonary hypertension is moderate. RVSP is 53 mmHg. Assessment  1. Acute hypoxic Type I respiratory failure  2. Acute viral pneumonia -FILM ARR Rhinovirus/Enterovirus Detected  (A)  3. Respiratory Alkalosis  4. Improved HF with DDysfunction  5. Doubt pneumonia  6. Complex UTI   7. Lactic acidosis resolved  8. Hx of RA on Prednisone and Plaquenil  9. Hx of Parkinsonims  10. Immunocompromized host    Plan  1. ID has been consulted  2. A CT scan was ordered but I really don't think she has a PE but was called to ask about a midline has been placed.  She may have RA lung disease so we can look at

## 2018-07-06 NOTE — PROGRESS NOTES
developed. Head: Normocephalic and atraumatic. Neck: Neck supple. No hepatojugular reflux. No JVD present. Carotid bruit is not present. No tracheal deviation present. No thyromegaly present. Cardiovascular: Normal rate, regular rhythm, normal heart sounds. intact distal pulses. No gallop and no friction rub. No murmur heard. Pulmonary: Breath sounds normal. No respiratory distress. No wheezes. No rales. Chest: Effort normal. No tenderness. Abdominal: Soft. Bowel sounds are normal. No distension or mass. No tenderness, rebound or guarding. Musculoskeletal: . No tenderness. No clubbing or cyanosis. Extremitites: Intact distal pulses. No edema  Neurological: Alert and oriented to person, place, and time. Skin: Skin is warm and dry. No rash noted. Not diaphoretic. No erythema. Psychiatric: Normal mood and affect. Behavior is normal.   Lymphadenopathy: No cervical adenopathy. No groin adenopathy.       CBC:   Lab Results   Component Value Date    WBC 9.6 07/06/2018    RBC 3.94 07/06/2018    HGB 10.8 07/06/2018    HCT 35.8 07/06/2018    MCV 90.9 07/06/2018    MCH 27.4 07/06/2018    MCHC 30.2 07/06/2018    RDW 16.6 07/06/2018     07/06/2018    MPV 11.3 07/06/2018     BMP:   Lab Results   Component Value Date     07/06/2018    K 4.2 07/06/2018     07/06/2018    CO2 21 07/06/2018    BUN 17 07/06/2018    LABALBU 3.1 07/06/2018    CREATININE 0.7 07/06/2018    CALCIUM 9.3 07/06/2018    GFRAA >60 07/06/2018    LABGLOM >60 07/06/2018     Magnesium:    Lab Results   Component Value Date    MG 1.7 04/10/2018     Cardiac Enzymes:   Lab Results   Component Value Date    CKTOTAL 62 02/11/2018    TROPONINI 0.02 07/05/2018    TROPONINI 0.01 04/10/2018    TROPONINI 0.05 (H) 03/19/2018      PT/INR:    Lab Results   Component Value Date    PROTIME 10.8 12/13/2017    INR 1.0 12/13/2017     TSH:    Lab Results   Component Value Date    TSH 1.520 07/06/2017     Rhythm Strip: normal sinus

## 2018-07-06 NOTE — CARE COORDINATION
Have spoken with Gracie Ponce at [facility]. They will accept patient and initiate precert, no need to wait for auth. She can discharge prior to precert being obtained and can go over weekend if able. HENS, envelope and ambulance form on soft chart.

## 2018-07-06 NOTE — PROGRESS NOTES
Occupational Therapy        OCCUPATIONAL THERAPY INITIAL EVALUATION      Date:2018  Patient Name: Paul Jane  MRN: [id number]  : 1945  Room: [address] [place] [address]     Evaluating OT: Hannah Wilburn OTR/CLAUDIO    AM-PAC Daily Activity Scale: 10/24      Recommended Adaptive Equipment: to be determined     Diagnosis: UTI    Past Medical History:  Past Medical History:   Diagnosis Date    Arrhythmia     Bladder disorder, other     over active bladder    Fibromyalgia     H/O mammogram 2016    Shobha-negative    Nausea & vomiting     Osteoarthritis     PONV (postoperative nausea and vomiting)     Rheumatoid arthritis ([address]         Precautions: fall    Home living:  Pt lives with her   in a one story home with 3 steps to enter. Pt reports that she has a rollator that she uses independently. Pt is assisted for ADLS by a home health aide and her .   Occupation:  Retired     Hobbies none stated      Pain Level: pt c/o no pain this session     Hearing: intact    Vision: appears to be intact      Line and Tubes: PIV, balbuena    Cognition: oriented x 2-3  Memory: fair  Attention: fair  Problem Solving: poor  Safety Awareness: poor      UE Assessment:  Hand Dominance: Right [x]  Left []      ROM:  LUE: BFL  RUE: BFL       Strength:  L UE: 2-/5 Proximal; 3/5 Distal  R UE: 2-/5 Proximal; 3/5 Distal          Functional Assessment:   Initial Status  Comments   Feeding  Max A  Pt had her breakfast tray and had not touched it despite it sitted in front of her    Grooming  Max A    Upper Body Dressing Max A  To don/[facility] gown while seated at edge of bed     Lower Body Dressing Dependent  Pt not able to don footie socks    Bathing Max A    Toileting  Dependent  Balbuena in place and needed to use bedpan during eval    Bed Mobility  Supine to Sit: Mod A  Sit to Supine:Max A  Needed assistand for Both upper and lower body to get back to bed    Functional Transfers Mod A of 2   For sit to Transfers to Mod A of 1  Goal #7. Increase Functional Mobility for household distance to Mod A  Goal #8. Increase sitting balance to good to increase I with ADLS and transfers  Goal #9. Increase static standing balance to Min A to increase I with ADLS and transfers    Patient seen this date for OT eval secondary to admittance for UTI. Patient reports that prior to admittance she is assisted by a home health aide and her  for most of her ADLS. Pt is ambulatory with her rollator at household distance. Pt was seen for UB dressing/LB dressing/transfers/toileting this date and is as noted above. Patient states that he/she uses rollator for mobility and has shower chair and grab bars at home. Patient currently presents with deficits in ADLS, mobility, transfers, balance, endurance and cognition and will benefit from continued OT tx to address these deficits.       Patient and/or family understands diagnosis, prognosis and plan of care: continue with plan as outlined above       OT Eval:  Timed Treatment Minutes:[id number]    Rosie Henry OTR/L #[id number]

## 2018-07-06 NOTE — PROGRESS NOTES
doses  - watch her off abx  - encourage PO intake        Electronically signed by Bradford Blank MD on 7/6/2018 at 12:16 PM

## 2018-07-06 NOTE — PROGRESS NOTES
[address] [facility]  Physical Therapy Initial Evaluation    Name: Vladimir Méndez  : 1945  MRN: [id number]    Date of Service: 2018    Evaluating Therapist: Joe Holley PT, DPT       Equipment Recommendations: to be determined     Room #: 8508/8508-B  DIAGNOSIS: respiratory distress  PRECAUTIONS: fall risk, history of Parkinson's, bed alarm    Social:  Pt lives with  in a 1 floor plan 3 steps and 2 rails to enter. Prior to admission pt walked with rollator independently per pt. Pt reports recent fall at home. Initial Evaluation  Date:  Treatment      Short Term/ Long Term   Goals   Was pt agreeable to Eval/treatment? yes     Does pt have pain? No c/o pain     Bed Mobility  Rolling: max A   Supine to sit: max A   Sit to supine: dep   Scooting: dep   Min A    Transfers Sit to stand: max A   Stand to sit: max A   Stand pivot: NT  Mod A    Ambulation    attempted but pt unable. 5+ feet with AAD with mod A    Stair negotiation: ascended and descended  NT  NT   AM-PAC Raw Score          ROM:  L LE grossly WFL  R LE grossly WFL  Strength:   L LE grossly 3/5  R LE grossly 3/5  Balance: standing max A - pt retropulsive  Sensation: no c/o numbness/tingling. Endurance: poor  Bed alarm - applied end of evaluation     ASSESSMENT  Pt displays functional ability as noted in the objective portion of this evaluation. Comments/Treatment:  Pt found laying in bed agreeable to evaluation. Pt instructed in mobility. Practiced sit to stand multiple times. Pt retropulsive and unable to correct each time. Pt left laying in bed with call button in reach end of evaluation, bed alarm applied. Patient education  Pt educated on mobility. Patient response to education:   Pt verbalized understanding Pt demonstrated skill Pt requires further education in this area     x     Rehab potential is Good for reaching above PT goals. Pts/ family goals   1. None stated.      Patient and or family

## 2018-07-07 NOTE — PROGRESS NOTES
been placed. She may have RA lung disease so we can look at that. The V/Q will not be helpful with an abnormal radiograph  4. May have RA related lung disease  5. Follow blood cultures - negative  6. Viral panel is + FILM ARR Rhinovirus/Enterovirus Detected  (A)  7. PT OT  8.  Stop IVF @ [address], MPH, FCCP, Laisha Kaur, [address],

## 2018-07-07 NOTE — PROGRESS NOTES
Subjective:  47-year-old  woman with complicated medical history  Admitted with hypoxic respiratory failure and high fever  Off antibiotic  She feels much better this morning  Tolerating medications well  Mental status at her baseline  Objective:    /81   Pulse 53   Temp 98.1 °F (36.7 °C) (Oral)   Resp 16   Ht 5' (1.524 m)   Wt 132 lb 4.8 oz (60 kg)   SpO2 95%   BMI 25.84 kg/m²    Looked somewhat pale  Awake alert and oriented  Oral mucosa moist  Neck supple  Heart:  RRR, no murmurs, gallops, or rubs.   Lungs: few scattered rales  Abd: bowel sounds present, nontender, nondistended, no masses  Extrem:  No clubbing, cyanosis, or edema  Generalized muscle atrophy symmetrical  Data reviewed    Assessment:  Viral pneumonitis  Acute hypoxic respiratory failure  Doubt pulmonary embolism  Doubt CHF  Suspected rheumatoid lung disease  Recurrent catheter associated UTI  Multiple comorbidities  Patient Active Problem List   Diagnosis    Senile osteoporosis    Generalized osteoarthritis    Coxitis    Parkinson disease (HCC)    Palpitations    Colitis    Lumbosacral spondylosis    Bursitis of right hip    Primary osteoarthritis of left knee    Memory impairment    Intractable back pain    Acute midline low back pain without sciatica    Closed compression fracture of L4 lumbar vertebra (HCC)    Closed compression fracture of thoracic vertebra (HCC)    Altered mental status    Sepsis ([address]    Respiratory distress    Congestive heart failure ([address]       Plan:  CT angiogram pending  Off IVF  Check RF CRP  Continue current medical management  Workup in progress        June Tatum  5:15 PM  7/7/2018

## 2018-07-07 NOTE — PROGRESS NOTES
[address] [place] [address] [facility]  Progress Note    SUBJECTIVE:  Chief Complaint   Patient presents with    Shortness of Breath     pt pulse ox at home 80% on room air-placed on bipap-pulse ox 97%. pt with hx uti's-came from home with joseph-intact, draining cloudy yellow urine. Patient is tolerating medications. No reported adverse drug reactions. No nausea, vomiting, diarrhea. Denies any dyspnea or cough. Afebrile. Review of systems:  As stated above in the chief complaint, otherwise negative. Medications:  Scheduled Meds:   sodium chloride flush  10 mL Intravenous 2 times per day    carbidopa-levodopa  1 tablet Oral TID    amLODIPine  5 mg Oral Daily    aspirin  325 mg Oral Daily    atenolol  25 mg Oral QAM    DULoxetine  60 mg Oral Daily    folic acid  1 mg Oral Daily    hydroxychloroquine  200 mg Oral Daily    potassium chloride  20 mEq Oral TID    predniSONE  20 mg Oral Daily    ursodiol  300 mg Oral Daily    vitamin B-12  1,000 mcg Oral Daily    enoxaparin  40 mg Subcutaneous Daily    atorvastatin  40 mg Oral Nightly     Continuous Infusions:  PRN Meds:sodium chloride flush, traMADol    OBJECTIVE:  BP (!) 146/93   Pulse 74   Temp 97.6 °F (36.4 °C) (Oral)   Resp 16   Ht 5' (1.524 m)   Wt 132 lb 4.8 oz (60 kg)   SpO2 98%   BMI 25.84 kg/m²   Temp  Av.9 °F (36.6 °C)  Min: 97.6 °F (36.4 °C)  Max: 98.2 °F (36.8 °C)    Constitutional: The patient is awake, alert, and oriented. Skin: Warm and dry. No rashes were noted. Generalized ecchymosis  HEENT: Eyes show round, and reactive pupils. No jaundice. Moist mucous membranes, no ulcerations, no thrush. Neck: Supple to movements. No lymphadenopathy. Chest: No use of accessory muscles to breathe. Symmetrical expansion. Diminished bases bilaterally  Cardiovascular: S1 and S2 are rhythmic and regular. No murmurs appreciated. Abdomen: Positive bowel sounds to auscultation. Benign to palpation. No masses felt.

## 2018-07-07 NOTE — PLAN OF CARE
Problem: Risk for Impaired Skin Integrity  Goal: Tissue integrity - skin and mucous membranes  Structural intactness and normal physiological function of skin and  mucous membranes.     Outcome: Met This Shift      Problem: Falls - Risk of:  Goal: Will remain free from falls  Will remain free from falls   Outcome: Met This Shift    Goal: Absence of physical injury  Absence of physical injury   Outcome: Met This Shift

## 2018-07-08 NOTE — PROGRESS NOTES
Physical Therapy  Facility/Department: [address] [facility]  Daily Treatment Note  NAME: Isaiah Perez  : 1945  MRN: [id number]    Date of Service: 2018    Evaluating Therapist: Tara Fields PT, DPT         Equipment Recommendations: to be determined      Room #: 8508/8508-B  DIAGNOSIS: respiratory distress  PRECAUTIONS: fall risk, history of Parkinson's, bed alarm     Social:  Pt lives with  in a 1 floor plan 3 steps and 2 rails to enter. Prior to admission pt walked with rollator independently per pt. Pt reports recent fall at home.        Initial Evaluation  Date:  Treatment Date: 18      Short Term/ Long Term   Goals   Was pt agreeable to Eval/treatment? yes Yes     Does pt have pain? No c/o pain No current complaints of pain     Bed Mobility  Rolling: max A   Supine to sit: max A   Sit to supine: dep   Scooting: dep  Rolling: Mod A  Supine to sit: Mod A  Sit to supine: Max A x2  Scooting: Max A x2 toward [facility] Min A    Transfers Sit to stand: max A   Stand to sit: max A   Stand pivot: NT Sit to stand: Max A  Stand to sit: Max A  Stand pivot: NT Mod A    Ambulation    attempted but pt unable. Unable 5+ feet with AAD with mod A    Stair negotiation: ascended and descended  NT NT NT   AM-PAC Raw Score         Patient education  Pt educated on proper technique during sit to stand transfer. Patient response to education:   Pt verbalized understanding Pt demonstrated skill Pt requires further education in this area   Yes No Yes     Comments:   Pt was supine in bed upon room entry, agreeable to PT treatment with OT collaboration. Pt required assistance with trunk mobility during supine to sit transfer. Pt reported mild dizziness upon sitting and required several minutes of seated rest for symptoms to subside. Pt experienced moderate to severe retropulsion when seated and required assistance and cueing to correct. Pt performed sit to stand x3 reps.  Pt experienced severe

## 2018-07-08 NOTE — PROGRESS NOTES
[facility]  Division of Pulmonary, Critical Care and Sleep Medicine  Progress Note      Admit Date:  7/5/2018    MRN:   [id number]        Patient:        PCP:   Vinny Regalado MD    CONSULT : Type I RF? SUBJECTIVE:    Open sore on leg was banadged  Echo noted DD with MR      OBJECTIVE:     PHYSICAL EXAM:   VITALS:   Patient Vitals for the past 8 hrs:   BP Temp Temp src Pulse Resp SpO2   07/08/18 0730 134/89 97.9 °F (36.6 °C) Oral 67 18 93 %   07/08/18 0515 - - - - - 100 %       Intake/Output Summary (Last 24 hours) at 07/08/18 1215  Last data filed at 07/08/18 0528   Gross per 24 hour   Intake              600 ml   Output             1200 ml   Net             -600 ml        CONSTITUTIONAL:   A&O x 3, NAD  SKIN:    Chronic brusing from long term steroids. RA changes  HEENT:    EOMI, MMM, No thrush  NECK:   No bruits, No JVP apprechiated  CV:     Sinus,  No Murmus,   PULMONARY:  Couse,  crackles  ABDOMEN:    Soft, non-tender. BS normal. No R/R/G  EXT:   No deformities or skin discoloration.   + lower extremity edema, Stasis  PULSE:  Peripheral pulses present 1+   PSYCHIATRIC: Approprate  MS:   Muscle weakness, Atrophy  NEUROLOGIC:  Non focal exam.     DATA: IMAGING & TESTING:     LABORATORY TESTS:    CBC:   Lab Results   Component Value Date    WBC 9.3 07/08/2018    RBC 4.21 07/08/2018    HGB 12.0 07/08/2018    HCT 37.9 07/08/2018    MCV 90.0 07/08/2018    MCH 28.5 07/08/2018    MCHC 31.7 07/08/2018    RDW 16.5 07/08/2018     07/08/2018    MPV 11.0 07/08/2018     CMP:    Lab Results   Component Value Date     07/08/2018    K 4.5 07/08/2018     07/08/2018    CO2 27 07/08/2018    BUN 19 07/08/2018    CREATININE 0.7 07/08/2018    GFRAA >60 07/08/2018    LABGLOM >60 07/08/2018    GLUCOSE 93 07/08/2018    PROT 6.2 07/08/2018    LABALBU 3.4 07/08/2018    CALCIUM 9.7 07/08/2018    BILITOT 0.3 07/08/2018    ALKPHOS 144 07/08/2018    AST 25 07/08/2018    ALT 9 07/08/2018

## 2018-07-08 NOTE — PROGRESS NOTES
not touched it despite it sitted in front of her Max A    Grooming  Max A Min A- while seated     Upper Body Dressing Max A  To don/[facility] gown while seated at edge of bed  Max A- per eval    Lower Body Dressing Dependent  Pt not able to don footie socks  Dependent- to don socks while seated EOB   Bathing Max A Max A- per eval    Toileting  Dependent  Balbuena in place and needed to use bedpan during eval Dependent- per eval    Bed Mobility  Supine to Sit: Mod A  Sit to Supine:Max A  Needed assistand for Both upper and lower body to get back to bed Supine to Sit: Mod A  Sit to Supine:Max A    Education given on technique to increase independence.    Functional Transfers Mod A of 2   For sit to stand at edge of bed to rolling walker with noted retropulsion   Max A- sit<->stand, cuing on hand placement and body mechanics. Pt retropulsive when standing. Functional Mobility Max A  Pt not able to take any steps this date Max A- per eval       Static Sit balance: fair              Static Stand balance: poor     Dynamic Stand Balance: poor  Endurance/Activity tolerance: poor  Sensation: intact      Comments: Upon arrival pt supine in bed. Pt educated on techniques to increase independence and safety during ADL's, bed mobility, and functional transfers. Education given on BUE AROM exercises all planes to increase strength and endurance for increased independence with ADL's. At end of session pt left supine in bed, call light within reach. · Pt has made limited progress towards set goals.      · Continue with current plan of care    Time in: 10:50  Time out: 11:13  Total Tx Time: 23 mins    Rajani Hicks

## 2018-07-09 NOTE — PROGRESS NOTES
Subjective:  52-year-old  woman with complicated medical history  Admitted with hypoxic respiratory failure and high fever  Off antibiotic  She feels much better this morning  Tolerating medications well  Mental status at her baseline  Objective:    /84   Pulse 68   Temp 98.2 °F (36.8 °C) (Temporal)   Resp 18   Ht 5' (1.524 m)   Wt 132 lb 4.8 oz (60 kg)   SpO2 96%   BMI 25.84 kg/m²    Looked somewhat pale  Awake alert and oriented  Oral mucosa moist  Neck supple  Heart:  RRR, no murmurs, gallops, or rubs.   Lungs: few scattered rales  Abd: bowel sounds present, nontender, nondistended, no masses  Extrem:  No clubbing, cyanosis, or edema  Generalized muscle atrophy symmetrical  Data reviewed    Assessment:  Viral pneumonitis  Acute hypoxic respiratory failure  Doubt pulmonary embolism  Doubt CHF  Suspected rheumatoid lung disease/doubt/ RF ok  Recurrent catheter associated UTI  Multiple comorbidities  Patient Active Problem List   Diagnosis    Senile osteoporosis    Generalized osteoarthritis    Coxitis    Parkinson disease (HCC)    Palpitations    Colitis    Lumbosacral spondylosis    Bursitis of right hip    Primary osteoarthritis of left knee    Memory impairment    Intractable back pain    Acute midline low back pain without sciatica    Closed compression fracture of L4 lumbar vertebra (HCC)    Closed compression fracture of thoracic vertebra (HCC)    Altered mental status    Sepsis ([address]    Respiratory distress    Congestive heart failure ([address]       Plan  Dc to XU  Reviewed with the pt        Josr Crawford  10:06 AM  7/9/2018

## 2018-07-09 NOTE — PROGRESS NOTES
[facility]  Division of Pulmonary, Critical Care and Sleep Medicine  Progress Note      Admit Date:  7/5/2018    MRN:   [id number]        Patient:        PCP:   June Tatum MD    CONSULT : Type I RF? SUBJECTIVE:    Open sore on leg was banadged  Echo noted DD with MR      OBJECTIVE:     PHYSICAL EXAM:   VITALS:   Patient Vitals for the past 8 hrs:   BP Temp Temp src Pulse Resp SpO2   07/09/18 0745 126/84 98.2 °F (36.8 °C) Temporal 68 18 96 %   07/09/18 0524 (!) 148/86 97.7 °F (36.5 °C) Oral 60 18 -   07/09/18 0415 - - - - - 97 %       Intake/Output Summary (Last 24 hours) at 07/09/18 0848  Last data filed at 07/09/18 0708   Gross per 24 hour   Intake              600 ml   Output              500 ml   Net              100 ml        CONSTITUTIONAL:   A&O x 3, NAD  SKIN:    Chronic brusing from long term steroids. RA changes  HEENT:    EOMI, MMM, No thrush  NECK:   No bruits, No JVP apprechiated  CV:     Sinus,  No Murmus,   PULMONARY:  Couse,  crackles  ABDOMEN:    Soft, non-tender. BS normal. No R/R/G  EXT:   No deformities or skin discoloration.   + lower extremity edema, Stasis  PULSE:  Peripheral pulses present 1+   PSYCHIATRIC: Approprate  MS:   Muscle weakness, Atrophy  NEUROLOGIC:  Non focal exam.     DATA: IMAGING & TESTING:     LABORATORY TESTS:    CBC:   Lab Results   Component Value Date    WBC 10.7 07/09/2018    RBC 4.24 07/09/2018    HGB 11.9 07/09/2018    HCT 38.2 07/09/2018    MCV 90.1 07/09/2018    MCH 28.1 07/09/2018    MCHC 31.2 07/09/2018    RDW 16.4 07/09/2018     07/09/2018    MPV 10.9 07/09/2018     CMP:    Lab Results   Component Value Date     07/09/2018    K 4.7 07/09/2018     07/09/2018    CO2 26 07/09/2018    BUN 23 07/09/2018    CREATININE 0.7 07/09/2018    GFRAA >60 07/09/2018    LABGLOM >60 07/09/2018    GLUCOSE 91 07/09/2018    PROT 6.0 07/09/2018    LABALBU 3.4 07/09/2018    CALCIUM 9.4 07/09/2018    BILITOT 0.3 07/09/2018 Johnnie Cifuentes DO, MPH, FCCP, FACOI, FACP,

## 2018-07-10 NOTE — CARE COORDINATION
[facility] Update Call    7/10/2018    Patient: Roger Roberts Patient : 1945   MRN: <[id number]>  Reason for Admission: AVS Diagnosis:  Sepsis, Respiratory Distress, Congestive Heart Failure  Discharge Date: 18 RARS: Readmission Risk Score: 19       Spoke with Mame Reese, at [facility] and confirmed patient is currently at the facility. Discussed will be calling for weekly patient progress updates; RN CTC contact information provided.

## 2018-07-23 PROBLEM — N39.0 UTI (URINARY TRACT INFECTION): Status: ACTIVE | Noted: 2018-01-01

## 2018-07-23 NOTE — ED PROVIDER NOTES
Negative for adenopathy. Does not bruise/bleed easily. Psychiatric/Behavioral: Positive for confusion. Physical Exam   Constitutional: She is oriented to person, place, and time. She appears well-developed and well-nourished. No distress. HENT:   Head: Normocephalic and atraumatic. Right Ear: External ear normal.   Left Ear: External ear normal.   Nose: Nose normal.   Mouth/Throat: Oropharynx is clear and moist. No oropharyngeal exudate. Eyes: Conjunctivae and EOM are normal. Pupils are equal, round, and reactive to light. Right eye exhibits no discharge. Left eye exhibits no discharge. No scleral icterus. Neck: Normal range of motion. Neck supple. No JVD present. No tracheal deviation present. No thyromegaly present. Cardiovascular: Normal rate, regular rhythm, normal heart sounds and intact distal pulses. Exam reveals no gallop and no friction rub. No murmur heard. Pulmonary/Chest: Effort normal and breath sounds normal. No stridor. No respiratory distress. She has no wheezes. She has no rales. She exhibits no tenderness. Abdominal: Soft. Bowel sounds are normal. She exhibits no distension and no mass. There is no tenderness. There is no rebound and no guarding. Musculoskeletal: Normal range of motion. She exhibits no edema, tenderness or deformity. Lymphadenopathy:     She has no cervical adenopathy. Neurological: She is alert and oriented to person, place, and time. Skin: Skin is warm and dry. No rash noted. She is not diaphoretic. No erythema. No pallor. Psychiatric: She has a normal mood and affect. Her behavior is normal. Judgment and thought content normal.   Nursing note and vitals reviewed. Procedures    MDM  Patient presented to the emergency department from nursing home with report of altered mental status. Patient was recently being treated for urinary tract infection. She was on Augmentin. Patient and cefepime in the emergency department.  Urinary tract infection still present on urinalysis. Patient did have a Balbuena in place. Patient was altered and confused. Her  confirm that she was worse than her baseline mental status. Patient was tachycardic and had an elevated lactic acid. White blood cell count was also elevated at 13.1 patient had normal labs otherwise. Cardiac workup unremarkable. Patient was admitted for sepsis likely secondary to UTI and altered mental status. EKG: This EKG is signed and interpreted by me. Rate: 107  Rhythm: Sinus  Interpretation: sinus tachycardia and PACs  Comparison: changes compared to previous EKG on 7/5/2018    ED Course as of Jul 23 1802 Mon Jul 23, 2018   1637 Case discussed with Dr. Annie Bocanegra. He agrees to accept the patient for admission.   [LS]      ED Course User Index  [LS] Gerardo Blanton, DO       --------------------------------------------- PAST HISTORY ---------------------------------------------  Past Medical History:  has a past medical history of Arrhythmia; Bladder disorder, other; Fibromyalgia; H/O mammogram; Nausea & vomiting; Osteoarthritis; PONV (postoperative nausea and vomiting); and Rheumatoid arthritis ([facility] [address] Past Surgical History:  has a past surgical history that includes Hysterectomy; Rectocele repair; other surgical history; Fixation Kyphoplasty (08/06/2014); Colonoscopy; eye surgery (Bilateral, 1/2014); Cystoscopy (4/13/15); Rectal surgery (02/19/2016); doppler echocardiography; Knee arthroscopy; and Fixation Kyphoplasty (12/13/2017). Social History:  reports that she has never smoked. She has never used smokeless tobacco. She reports that she does not drink alcohol or use drugs. Family History: family history is not on file. The patients home medications have been reviewed. Allergies: Etanercept; Flagyl [metronidazole]; Tofacitinib; Aricept [donepezil hcl]; Codeine; Levaquin [levofloxacin];  Other; Sulfa antibiotics; and Vicodin [hydrocodone-acetaminophen]    -------------------------------------------------- RESULTS -------------------------------------------------    LABS:  Results for orders placed or performed during the hospital encounter of 07/23/18   Troponin   Result Value Ref Range    Troponin 0.03 0.00 - 0.03 ng/mL   Lactic Acid, Plasma   Result Value Ref Range    Lactic Acid 3.3 (H) 0.5 - 2.2 mmol/L   Urinalysis   Result Value Ref Range    Color, UA Yellow Straw/Yellow    Clarity, UA Clear Clear    Glucose, Ur Negative Negative mg/dL    Bilirubin Urine Negative Negative    Ketones, Urine Negative Negative mg/dL    Specific Gravity, UA >=1.030 1.005 - 1.030    Blood, Urine MODERATE (A) Negative    pH, UA 5.5 5.0 - 9.0    Protein, UA 30 (A) Negative mg/dL    Urobilinogen, Urine 0.2 <2.0 E.U./dL    Nitrite, Urine Negative Negative    Leukocyte Esterase, Urine MODERATE (A) Negative   CK   Result Value Ref Range    Total CK 42 20 - 180 U/L   Microscopic Urinalysis   Result Value Ref Range    WBC, UA 5-10 0 - 5 /HPF    RBC, UA 5-10 (A) 0 - 2 /HPF    Renal Epithelial, Urine MODERATE /HPF    Bacteria, UA MODERATE (A) /HPF    Amorphous, UA RARE    Comprehensive Metabolic Panel   Result Value Ref Range    Sodium 135 132 - 146 mmol/L    Potassium 4.2 3.5 - 5.0 mmol/L    Chloride 101 98 - 107 mmol/L    CO2 17 (L) 22 - 29 mmol/L    Anion Gap 17 (H) 7 - 16 mmol/L    Glucose 102 74 - 109 mg/dL    BUN 22 8 - 23 mg/dL    CREATININE 0.7 0.5 - 1.0 mg/dL    GFR Non-African American >60 >=60 mL/min/1.73    GFR African American >60     Calcium 10.1 8.6 - 10.2 mg/dL    Total Protein 6.3 (L) 6.4 - 8.3 g/dL    Alb 3.5 3.5 - 5.2 g/dL    Total Bilirubin 0.3 0.0 - 1.2 mg/dL    Alkaline Phosphatase 103 35 - 104 U/L    ALT 17 0 - 32 U/L    AST 19 0 - 31 U/L   CBC Auto Differential   Result Value Ref Range    WBC 13.1 (H) 4.5 - 11.5 E9/L    RBC 5.05 3.50 - 5.50 E12/L    Hemoglobin 14.3 11.5 - 15.5 g/dL    Hematocrit 45.7 34.0 - 48.0 %    MCV 90.5 80.0 - 99.9 fL    MCH 28.3 26.0 - 35.0 pg    MCHC 31.3 (L) 32.0 - 34.5 %    RDW 16.8 (H) 11.5 - 15.0 fL    Platelets 096 322 - 293 E9/L    MPV 12.4 (H) 7.0 - 12.0 fL    Neutrophils % 68.9 43.0 - 80.0 %    Immature Granulocytes % 1.8 0.0 - 5.0 %    Lymphocytes % 19.8 (L) 20.0 - 42.0 %    Monocytes % 8.1 2.0 - 12.0 %    Eosinophils % 0.9 0.0 - 6.0 %    Basophils % 0.5 0.0 - 2.0 %    Neutrophils # 9.05 (H) 1.80 - 7.30 E9/L    Immature Granulocytes # 0.23 E9/L    Lymphocytes # 2.60 1.50 - 4.00 E9/L    Monocytes # 1.06 (H) 0.10 - 0.95 E9/L    Eosinophils # 0.12 0.05 - 0.50 E9/L    Basophils # 0.06 0.00 - 0.20 E9/L   Lactic Acid, Plasma   Result Value Ref Range    Lactic Acid 1.9 0.5 - 2.2 mmol/L   EKG 12 Lead   Result Value Ref Range    Ventricular Rate 107 BPM    Atrial Rate 107 BPM    P-R Interval 136 ms    QRS Duration 76 ms    Q-T Interval 344 ms    QTc Calculation (Bazett) 459 ms    P Axis 39 degrees    R Axis 9 degrees    T Axis 108 degrees       RADIOLOGY:  XR CHEST PORTABLE   Final Result   No gross focal consolidation. CT Head WO Contrast   Final Result   1. No evidence of intracranial hemorrhage, extra axial collection,   space-occupying mass lesion or mass effect, midline shift, or acute   territorial infarction. If there is strong clinical suspicion for   acute infarct of the brain, then MR imaging of the brain with   diffusion-weighted sequence may be obtained for further evaluation. 2. Chronic involutional changes and moderate microvascular ischemic   disease as described above.                ------------------------- NURSING NOTES AND VITALS REVIEWED ---------------------------  Date / Time Roomed:  7/23/2018 10:38 AM  ED Bed Assignment:  11/11    The nursing notes within the ED encounter and vital signs as below have been reviewed.      Patient Vitals for the past 24 hrs:   BP Temp Temp src Pulse Resp SpO2   07/23/18 1730 104/75 - - 103 21 97 %   07/23/18 1630 121/77 - - 105 18 95 %   07/23/18 1542 125/82 - - 109 15 97 %   07/23/18 1240 (!) 148/98 - - 105 24 96 %   07/23/18 1202 (!) 139/97 - - 106 24 97 %   07/23/18 1124 (!) 122/93 - - 102 25 96 %   07/23/18 1102 126/89 - - - - 96 %   07/23/18 1056 131/86 - - 101 17 96 %   07/23/18 1055 131/86 - - 101 22 98 %   07/23/18 1041 110/78 97 °F (36.1 °C) Temporal 102 20 96 %       Oxygen Saturation Interpretation: Normal    ------------------------------------------ PROGRESS NOTES ------------------------------------------  Re-evaluation(s):  Time: 4984  Patients symptoms show no change  Repeat physical examination is not changed    Counseling:  I have spoken with the patient and  and discussed todays results, in addition to providing specific details for the plan of care and counseling regarding the diagnosis and prognosis. Their questions are answered at this time and they are agreeable with the plan of admission.    --------------------------------- ADDITIONAL PROVIDER NOTES ---------------------------------  Consultations:  Time: 3365. Spoke with Dr. Huseyin Mckay. Discussed case. They will admit the patient. This patient's ED course included: a personal history and physicial examination, re-evaluation prior to disposition, multiple bedside re-evaluations, IV medications, cardiac monitoring, continuous pulse oximetry and complex medical decision making and emergency management    This patient has remained hemodynamically stable during their ED course. Diagnosis:  1. Altered mental status, unspecified altered mental status type    2. Sepsis, due to unspecified organism ([facility]    3. Urinary tract infection associated with indwelling urethral catheter, initial encounter ([address]        Disposition:  Patient's disposition: Admit to telemetry  Patient's condition is stable.          Diane Mancilla DO  Resident  07/23/18 5849

## 2018-07-23 NOTE — ED NOTES
Bed: 11  Expected date:   Expected time:   Means of arrival:   Comments:     Benjamin Muhammad RN  07/23/18 1038

## 2018-07-23 NOTE — CARE COORDINATION
Social Work / Discharge Planning:    Pt presents to the ED secondary to altered mental status. Pt is from [facility] and recently discharged from this hospital on 7/9. SW spoke to pt's  over the phone. Pt's  reports pt has not been eating or drinking and has been mote lethargic. Pt's  reports he does not want pt to return to [facility] and plan will be for pt to go to [facility]. SW made initial referral to [facility]. Assigned SW to follow.     Electronically signed by Den Celaya on 7/23/2018 at 2:50 PM

## 2018-07-24 NOTE — PROGRESS NOTES
Physical Therapy    Facility/Department: [facility]  Initial Assessment    NAME: Ann-Marie Butts  : 1945  MRN: [id number]    Date of Service: 2018  Evaluating Therapist: Papo Art PT, DPT    Equipment Recommendations: To be determined. Room #: 3481T  DIAGNOSIS: AMS  PRECAUTIONS: Falls  PMHx: Parkinson's Disease    Social:  Pt admitted from [facility]. Prior to admission pt walked with ww. Initial Evaluation  Date: 18 Treatment  Date:     Short Term/ Long Term   Goals   Was pt agreeable to Eval/treatment? Yes     Does pt have pain? No c/o pain     Bed Mobility  Rolling: MaxA  Supine to sit: MaxA x2  Sit to supine: MaxA x2  Scooting: MaxA  ModA   Transfers Sit to stand: MaxA x2  Stand to sit: MaxA x2  Stand pivot: NT  -Pt unable to achieve full stand  ModA   Ambulation   NT  >25 feet with ModA with ww   Stair negotiation: ascended and descended NT  No stair goal at this time. AM-PAC        BLE ROM is WFL. BLE strength is grossly 4/5. Balance: MaxA x2 static and dynamic standing with ww. Sensation: No numbness or tingling. Edema: No notable edema. Endurance: Poor  Chair alarm: No     ASSESSMENT  Pt displays functional ability as noted in the objective portion of this evaluation. Comments/Treatment:  Pt found supine in bed and was agreeable to evaluation. Pt required trunk and BLE assistance for supine to sit transfer. Pt required VCs for proper sequencing for STS transfer. Pt rigid throughout evaluation limiting her trunk flexion during STS transfer. Pt performed STS transfer x6 attempts but was unable to achieve full stand due to retropulsion and rigidity. Pt reported being fearful of falling. Pt required trunk and BLE assistance for sit to supine transfer. Pt reported she needed to have a bowel movement and was assisted onto the bedpan. Nursing staff notified of pt left on the bedpan. Pt left supine in bed with call light in reach.     Patient education  Pt educated on safety with functional mobility and sequencing with STS transfer. Patient response to education:   Pt verbalized understanding Pt demonstrated skill Pt requires further education in this area   x x x     Rehab potential is Good for reaching above PT goals. Pts/ family goals   1. To return home. Patient and or family understand(s) diagnosis, prognosis, and plan of care. PLAN  PT care will be provided in accordance with the objectives noted above. Whenever appropriate, clear delegation orders will be provided for nursing staff. Exercises and functional mobility practice will be used as well as appropriate assistive devices or modalities to obtain goals. Patient and family education will also be administered as needed. Frequency of treatments will be 2-5x/week x 3-5 days.     Time in: 1355  Time out: 62 [facility] PT, DPT  License number:  [id number]

## 2018-07-24 NOTE — PLAN OF CARE
Problem: Infection, Septic Shock:  Goal: Will show no infection signs and symptoms  Will show no infection signs and symptoms  Outcome: Not Met This Shift

## 2018-07-24 NOTE — H&P
Julita Ravi MD FACP   History and Physical      CHIEF COMPLAINT:  Altered mental status      HISTORY OF PRESENT ILLNESS:      77-year-old  woman seen on [address] earlier this morning  Data reviewed in detail  Spoke with the   Spoke with the ER physician overnight  Patient with chronic indwelling urinary catheter with recurrent hospitalizations for infections  Recently treated for acute viral pneumonitis and was discharged to a local nursing home  Apparently the catheter accidentally came out and was reinserted  She became more confused over the last 2 days  Urinary tract infection was found and Augmentin was given  Patient did not improve with her mental status  Brought to emergency room where she was found to be septic with lactic acidosis and persistence of UTI  Admitted for further management  Past Medical History:    Past Medical History:   Diagnosis Date    Arrhythmia     Bladder disorder, other     over active bladder    Fibromyalgia     H/O mammogram 11/09/2016    Shobha-negative    Nausea & vomiting     Osteoarthritis     PONV (postoperative nausea and vomiting)     Rheumatoid arthritis ([address]        Past Surgical History:    Past Surgical History:   Procedure Laterality Date    COLONOSCOPY      CYSTOSCOPY  4/13/15    cysto retro botox injection    DOPPLER ECHOCARDIOGRAPHY      moderate aortic valve sclerosis with mild aortic stenosis-mild pulmonary and mitral insufficiency-mod tricuspid insuff with possible pulmonary htn-left ventricular diastolic dystfunction    EYE SURGERY Bilateral 1/2014    cataracts    FIXATION KYPHOPLASTY  08/06/2014    lumbar 4    FIXATION KYPHOPLASTY  12/13/2017    T11    HYSTERECTOMY      KNEE ARTHROSCOPY      20 years ago-  torn meniscus    OTHER SURGICAL HISTORY      Had a series of 3 epidural injections.      RECTAL SURGERY  02/19/2016    SIGMOID  RESECTION    RECTOCELE REPAIR         Medications Prior to Admission:    Prescriptions Prior to Admission: Multiple Vitamins-Minerals (CENTRUM ADULTS) TABS, Take 1 tablet by mouth daily  bisacodyl (DULCOLAX) 10 MG suppository, Place 10 mg rectally daily as needed for Constipation  magnesium hydroxide (MILK OF MAGNESIA) 400 MG/5ML suspension, Take 30 mLs by mouth daily as needed for Constipation  traMADol (ULTRAM) 50 MG tablet, Take 50 mg by mouth nightly. Linford Lillian amoxicillin-clavulanate (AUGMENTIN) 875-125 MG per tablet, Take 1 tablet by mouth 2 times daily  traMADol (ULTRAM) 50 MG tablet, Take 1 tablet by mouth every 6 hours as needed for Pain. .  carbidopa-levodopa (SINEMET)  MG per tablet, TAKE 1 TABLET THREE TIMES A DAY  atenolol (TENORMIN) 25 MG tablet, Take 1 tablet by mouth every morning  Calcium Carb-Cholecalciferol (CALCIUM-VITAMIN D) 500-200 MG-UNIT per tablet, Take 1 tablet by mouth 2 times daily  potassium chloride (KLOR-CON M) 20 MEQ extended release tablet, Take 20 mEq by mouth 3 times daily  predniSONE (DELTASONE) 20 MG tablet, Take 20 mg by mouth daily  vitamin B-12 (CYANOCOBALAMIN) 1000 MCG tablet, Take 1,000 mcg by mouth daily  DULoxetine (CYMBALTA) 60 MG extended release capsule, Take 1 capsule by mouth daily  amLODIPine (NORVASC) 5 MG tablet, Take 5 mg by mouth daily  ursodiol (ACTIGALL) 300 MG capsule, Take 300 mg by mouth daily  hydroxychloroquine (PLAQUENIL) 200 MG tablet, Take 200 mg by mouth daily   aspirin 325 MG EC tablet, Take 325 mg by mouth daily. folic acid (FOLVITE) 481 MCG tablet, Take 800 mcg by mouth daily. Allergies:    Etanercept; Flagyl [metronidazole]; Tofacitinib; Aricept [donepezil hcl]; Codeine; Levaquin [levofloxacin]; Other; Sulfa antibiotics; and Vicodin [hydrocodone-acetaminophen]    Social History:    reports that she has never smoked. She has never used smokeless tobacco. She reports that she does not drink alcohol or use drugs. Family History:   family history is not on file.     REVIEW OF SYSTEMS:  As above in the HPI, otherwise negative    PHYSICAL

## 2018-07-24 NOTE — PROGRESS NOTES
 PONV (postoperative nausea and vomiting)     Rheumatoid arthritis ([facility] [address]        Precautions: falls      Home Living: Admitted from [employer] [facility]: [employer]    Prior Level of Function: pt reports she was assisted for ADLs and was having trouble walking       Pain Level: 0/10    Cognition: oriented x 3; follows 3 step directions. SBA  Problem solving skills  SBA  Memory   SBA  Sequencing  SBA Safety Awareness/Judgement  Additional Comments: N/A     Sensory:   Hearing: WFL  Vision: WFL       UE Assessment:  Hand Dominance: Right [x]  Left []     Strength ROM             Additional Info:    RUE           4/5             WFL            WFL               N/A      LUE 4/5 WFL            WFL               N/A        Sensation: intact  Tone: normal  Edema:N/A    [facility]: [facility]  [facility]: [facility]     Functional Assessment:   Initial Status: Comments:                               GOALS:   Feeding  setup N/A  N/A    Grooming  Min A   N/A  Sup   Upper Body Dressing Mod  A N/A  Min A    Lower Body Dressing Max A  N/A  Mod A    Bathing Max A  N/A  Mod  A   Toileting  Max A  N/A  Mod A    Bed Mobility  Max A X 2 N/A  Mod A X 2   Functional Transfers Max A X 2 N/A   Mod A X 2     Functional Mobility NT N/A NT    Pt/Family participated in establishment of the goals. Sit balance: SBA   Stand balance: Max A X 2  Endurance/Activity tolerance: fair                               Comments: Upon arrival, pt supine in bed. At end of session, pt supine in bed with all devices within reach. Treatment: Patient performs func tasks as reported above. OT facilitated bed mobility/sit to stand x 4 with PT collaboration. Pt unable to achieve full stand d/t apraxia and anxiety. Educated pt in safety. Patient demonstrates fair understanding of education/instructions. Will benefit from continued OT intervention to increase participation in ADL tasks.      Assessment of current deficits   Functional mobility [x]  ADLs [x] Strength []  Cognition []  Functional transfers  [x] IADLs [x] Safety Awareness [x]  Endurance [x]  Fine Motor Coordination [] Balance [x] Vision/perception [] Sensation []   Gross Motor Coordination [] ROM []        Eval Complexity: mod  Profile and History- mod  Assessment of Occupational Performance and Identification of Deficits- mod  Clinical Decision Making- mod    Treatment frequency: PRN     Plan of Care:  ADL retraining [x]   Equipment needs [x]   Neuromuscular re-education [] Energy Conservation Techniques []  Functional Transfer training [x] Patient and/or Family Education [x]  Functional Mobility training [x]  Environmental Modifications []  Cognitive re-training [x]   Splinting Needs []     Positioning to improve overall function [x]  Other: []      Rehab Potential: fair for established goals     Patient / Family Goal: to be able to go home       Patient and/or family verbalizes understanding of diagnosis, prognosis, goals and plan of care: yes    [] Malnutrition indicators have been identified and nursing has been notified to ensure a dietitian consult is ordered.      Time in: 1:55 pm   Time out: 2:20 pm   Eval Time: 15 min   Total Tx Time: 10 min     Tonio Guerrero, OTR/L [id number]

## 2018-07-24 NOTE — CONSULTS
Palliative Medicine   Consult Note  Clinical Nurse Specialist    Requesting Provider: Dr. Savanah Roth   7/24/18    Admit Date: 7/23/2018    Reason for Consult:    _____ Advance Care Planning  __x___Establish Goals of Care  __x___ Psychosocial Support  _____Symptom Management    Recommendations:    1. Advanced Care Planning: patient has advance directives for healthcare naming her , Mamie Brush as [facility]. agent. The patient also has 2 adult daughters named as alternate agents. # [phone]. AD documents found under media in epic and dated 8/5/05. 2. Code status: full code   3. Anticipatory Guidance: 73 y/o female with h/o RA on chronic steroid therapy and chronic joseph for bladder dysfunction who presented with AMS. Recently hospitalized for UTI/sepsis earlier in this month. Consult for goals of care, code status and support. 4. Goals of care: continue current level of care; return to rehabilitation. PM will follow up with the patient's  for further discussion about goals of care. 5. Psychosocial Support: support provided. 6. Symptom Management: per medical team  Patient appears comfortable and denies symptoms. 7. From a Palliative Medicine standpoint, there is no need to delay discharge. CC:  AMS    History Obtained From:  electronic medical record    HISTORY OF PRESENT ILLNESS:                Alvaro Nelson is a 74 y.o. female with significant past medical history of RA on chronic prednisone and plaquenil and bladder disorder requiring chronic joseph catheter. She was recently admitted for UTI/sepsis from 7/5-7/9. She developed respiratory distress/hypoxia with congestion on CXR. She required bipap. Followed by cardiology, pulmonology and ID. Discharged to [place] [id number] on 7/9 with abx therapy. She presented from [facility] with AMS/confusion. WBC elevated at 13.1. Cultures pending. CXR without acute pathology. CT head without acute pathology.   Palliative Medicine has been consulted for goals, code status and support. Met with patient in room with no family at bedside. She appears comfortable and denies complaints. States that she is here because she frequently gets UTIs. Patient is alert/oriented to person and place. Confused with detailed information. States that her  left for the day and will be returning tomorrow. Introduced to palliative medicine. She acknowledged that she has AD for healthcare and her  assists with medical decision making. She also states that her 2 daughters assist with decisions as well. Her goal is to increase her strength and ability to ambulate. She is agreeable to rehabilitation. Palliative medicine information left at the bedside for the patient's  to review. Will follow up with the  when he is available. PM will follow and continue discussing goals of care with this patient and family        Past Medical History:    Past Medical History:   Diagnosis Date    Arrhythmia     Bladder disorder, other     over active bladder    Fibromyalgia     H/O mammogram 11/09/2016    Shobha-negative    Nausea & vomiting     Osteoarthritis     PONV (postoperative nausea and vomiting)     Rheumatoid arthritis ([facility] [address]        Past Surgical History:      Past Surgical History:   Procedure Laterality Date    COLONOSCOPY      CYSTOSCOPY  4/13/15    cysto retro botox injection    DOPPLER ECHOCARDIOGRAPHY      moderate aortic valve sclerosis with mild aortic stenosis-mild pulmonary and mitral insufficiency-mod tricuspid insuff with possible pulmonary htn-left ventricular diastolic dystfunction    EYE SURGERY Bilateral 1/2014    cataracts    FIXATION KYPHOPLASTY  08/06/2014    lumbar 4    FIXATION KYPHOPLASTY  12/13/2017    T11    HYSTERECTOMY      KNEE ARTHROSCOPY      20 years ago-  torn meniscus    OTHER SURGICAL HISTORY      Had a series of 3 epidural injections.      RECTAL SURGERY  02/19/2016    SIGMOID  RESECTION    RECTOCELE REPAIR         Current Medications:     cefTRIAXone (ROCEPHIN) IV  2 g Intravenous Q24H    hydroxychloroquine  200 mg Oral Daily    aspirin  325 mg Oral Daily    folic acid  9,441 mcg Oral Daily    ursodiol  300 mg Oral Daily    amLODIPine  5 mg Oral Daily    DULoxetine  60 mg Oral Daily    calcium-vitamin D  1 tablet Oral BID    potassium chloride  20 mEq Oral TID    predniSONE  20 mg Oral Daily    vitamin B-12  1,000 mcg Oral Daily    carbidopa-levodopa  1 tablet Oral 4x Daily    atenolol  25 mg Oral QAM    therapeutic multivitamin-minerals  1 tablet Oral Daily    traMADol  50 mg Oral Nightly    enoxaparin  40 mg Subcutaneous Daily       PRN Medications:  traMADol, bisacodyl, magnesium hydroxide    IV Medications:   sodium chloride 100 mL/hr at 07/23/18 2153       I&O  I/O this shift: In: 480 [P.O.:480]  Out: -     Intake/Output Summary (Last 24 hours) at 07/24/18 1401  Last data filed at 07/24/18 1353   Gross per 24 hour   Intake             2625 ml   Output              650 ml   Net             1975 ml       Allergies:  Etanercept; Flagyl [metronidazole]; Tofacitinib; Aricept [donepezil hcl]; Codeine; Levaquin [levofloxacin]; Other; Sulfa antibiotics; and Vicodin [hydrocodone-acetaminophen]    Social History:      Social History     Social History    Marital status:      Spouse name: N/A    Number of children: N/A    Years of education: N/A     Social History Main Topics    Smoking status: Never Smoker    Smokeless tobacco: Never Used    Alcohol use No    Drug use: No    Sexual activity: Not Asked     Other Topics Concern    None     Social History Narrative    None     Place of Residence : with family:  spouse      Family History:   History reviewed. No pertinent family history. REVIEW OF SYSTEMS:    CONSTITUTIONAL:  Denies fever, chill or rigors, nausea or vomiting, + fatigue.   HEENT: denies blurring of vision or double vision, denies hearing problem  RESPIRATORY: denies

## 2018-07-24 NOTE — CONSULTS
[facility]     Consult Note        Date:   7/24/2018  Patient name:  Chandrika Berry  Date of admission:  7/23/2018 10:38 AM  MRN:   [id number]  YOB: 1945    Reason for Consult:  UTI    CC:   Chief Complaint   Patient presents with    Altered Mental Status     change in mental status. recent UTI       HISTORY OF PRESENT ILLNESS:                The patient is a 74 y.o. female seen multiple times by me for urinary tract infection associated with Balbuena catheter. This time she was presenting from nursing home because of failure to thrive and concern for urinary tract infection she got her urinary catheter exchanged 3 days prior to the presentation. Patient has a denies having any fevers chills but according to the  is at the bedside she is much more alert oriented right now and whenever she looks infected she starts decreasing her by mouth intake. Patient also denies having any fever or chills, no shortness of breath, no nausea vomiting diarrhea      Past Medical History:   has a past medical history of Arrhythmia; Bladder disorder, other; Fibromyalgia; H/O mammogram; Nausea & vomiting; Osteoarthritis; PONV (postoperative nausea and vomiting); and Rheumatoid arthritis ([facility] [address] Past Surgical History:   has a past surgical history that includes Hysterectomy; Rectocele repair; other surgical history; Fixation Kyphoplasty (08/06/2014); Colonoscopy; eye surgery (Bilateral, 1/2014); Cystoscopy (4/13/15); Rectal surgery (02/19/2016); doppler echocardiography; Knee arthroscopy; and Fixation Kyphoplasty (12/13/2017). Home Medications:    Prior to Admission medications    Medication Sig Start Date End Date Taking?  Authorizing Provider   Multiple Vitamins-Minerals (CENTRUM ADULTS) TABS Take 1 tablet by mouth daily   Yes Historical Provider, MD   bisacodyl (DULCOLAX) 10 MG suppository Place 10 mg rectally daily as needed for Constipation   Yes Historical Provider, MD   magnesium hydroxide (MILK OF MAGNESIA) 400 MG/5ML suspension Take 30 mLs by mouth daily as needed for Constipation   Yes Historical Provider, MD   traMADol (ULTRAM) 50 MG tablet Take 50 mg by mouth nightly. . 7/6/18 8/5/18 Yes Historical Provider, MD   amoxicillin-clavulanate (AUGMENTIN) 875-125 MG per tablet Take 1 tablet by mouth 2 times daily 7/20/18 7/26/18 Yes Historical Provider, MD   traMADol (ULTRAM) 50 MG tablet Take 1 tablet by mouth every 6 hours as needed for Pain. . 7/9/18 7/9/19 Yes Julita Ravi MD   carbidopa-levodopa (SINEMET)  MG per tablet TAKE 1 TABLET THREE TIMES A DAY 6/11/18  Yes Julita Ravi MD   atenolol (TENORMIN) 25 MG tablet Take 1 tablet by mouth every morning 6/11/18  Yes Julita Ravi MD   Calcium Carb-Cholecalciferol (CALCIUM-VITAMIN D) 500-200 MG-UNIT per tablet Take 1 tablet by mouth 2 times daily   Yes Historical Provider, MD   potassium chloride (KLOR-CON M) 20 MEQ extended release tablet Take 20 mEq by mouth 3 times daily   Yes Historical Provider, MD   predniSONE (DELTASONE) 20 MG tablet Take 20 mg by mouth daily   Yes Historical Provider, MD   vitamin B-12 (CYANOCOBALAMIN) 1000 MCG tablet Take 1,000 mcg by mouth daily   Yes Historical Provider, MD   DULoxetine (CYMBALTA) 60 MG extended release capsule Take 1 capsule by mouth daily 3/13/18 7/23/18 Yes Julita Ravi MD   amLODIPine (NORVASC) 5 MG tablet Take 5 mg by mouth daily   Yes Historical Provider, MD   ursodiol (ACTIGALL) 300 MG capsule Take 300 mg by mouth daily   Yes Historical Provider, MD   hydroxychloroquine (PLAQUENIL) 200 MG tablet Take 200 mg by mouth daily    Yes Historical Provider, MD   aspirin 325 MG EC tablet Take 325 mg by mouth daily. Yes Historical Provider, MD   folic acid (FOLVITE) 391 MCG tablet Take 800 mcg by mouth daily. Yes Historical Provider, MD       Allergies:  Etanercept; Flagyl [metronidazole]; Tofacitinib;  Aricept [donepezil hcl]; Codeine; Levaquin [levofloxacin]; Other; Sulfa antibiotics; and Vicodin [hydrocodone-acetaminophen]    Social History:   reports that she has never smoked. She has never used smokeless tobacco. She reports that she does not drink alcohol or use drugs. Family History: family history is not on file. REVIEW OF SYSTEMS:    12 point ROS has been done and pertinent neg and positive has been included in HPI and rest are non contributory          PHYSICAL EXAM:    /65   Pulse 97   Temp 97.8 °F (36.6 °C) (Temporal)   Resp 18   Ht 5' (1.524 m)   Wt 113 lb 8 oz (51.5 kg)   SpO2 96%   BMI 22.17 kg/m²    VENT SETTINGS:        General appearance: Alert, Awake, Oriented times 3, no distress  Skin: Warm and dry  Eyes: Pink palpebral conjunctivae. PERRL  Ears: External ears normal. No tragal tenderness. TM intact  Nose/Sinuses: Nares normal. Septum midline. Mucosa normal. No sinus tenderness. Oropharynx: Oropharynx clear with no exudates seen  Neck: Neck supple. No jugular venous distension, lymphadenopathy or thyromegaly Trachea midline  Lungs: Lungs clear to auscultation bilaterally. No rhonchi, crackles or wheezes  Heart: S1 S2  Regular rate and rhythm. No rub, murmur or gallop  Abdomen: Abdomen soft, non-tender. BS normal. No masses, organomegaly  Extremities: No edema, Peripheral pulses palpable  Musculoskeletal: Muscular strength appears intact.  No joint effusion, tenderness, swelling or warmth      DATA:    Labs:     Last 3 CBC:  Recent Labs      07/23/18   1220  07/23/18   2200  07/24/18   0525   WBC  13.1*  12.1*  12.3*   RBC  5.05  4.46  4.43   HGB  14.3  12.7  12.6   PLT  366  317  308   MPV  12.4*  10.8  10.6       Last 3 BMP  Recent Labs      07/23/18   1220  07/23/18   2200  07/24/18   0525   NA  135  138  137   K  4.2  3.9  4.1   CL  101  105  102   CO2  17*  19*  18*   BUN  22  18  15   CREATININE  0.7  0.7  0.7   GLUCOSE  102  113*  138*   CALCIUM  10.1  9.1  9.1       LIVER PROFILE:  Recent

## 2018-07-25 NOTE — PROGRESS NOTES
ID Progress Note      Brief Interval History    Subjective: The patient is awake and alert. Tolerating medications. Reports no side effects. Afebrile. 10 ROS otherwise negative unless otherwise specified above. Objective:    Vitals:    07/25/18 1545   BP: 115/76   Pulse: 81   Resp: 18   Temp: 97.4 °F (36.3 °C)   SpO2: 96%     VENT SETTINGS:      General Appearance:    Awake, alert , no acute distress. HEENT:    Normocephalic,PERRL,neck supple, no JVD, mucosa moist, no thrush   Lungs:     Clear to auscultation bilaterally, no wheeze , crackles   Heart:    Regular rate and rhythm, no murmur   Abdomen:     Soft, non-tender, not distended  bowel sounds present,   Extremities:   No edema,no open wound,no erythema, non  tender   Skin:   no rashes or lesions     Labs:  Recent Labs      07/23/18   2200  07/24/18   0525  07/25/18   0600   WBC  12.1*  12.3*  11.3   RBC  4.46  4.43  3.99   HGB  12.7  12.6  11.6   HCT  39.4  38.6  34.7   MCV  88.3  87.1  87.0   MCH  28.5  28.4  29.1   MCHC  32.2  32.6  33.4   RDW  16.7*  16.5*  16.5*   PLT  317  308  282   MPV  10.8  10.6  10.7     CMP:    Lab Results   Component Value Date     07/25/2018    K 3.8 07/25/2018     07/25/2018    CO2 22 07/25/2018    BUN 13 07/25/2018    CREATININE 0.7 07/25/2018    GFRAA >60 07/25/2018    LABGLOM >60 07/25/2018    GLUCOSE 82 07/25/2018    PROT 5.4 07/25/2018    LABALBU 3.1 07/25/2018    CALCIUM 9.4 07/25/2018    BILITOT 0.3 07/25/2018    ALKPHOS 81 07/25/2018    AST 15 07/25/2018    ALT <5 07/25/2018          Microbiology :  Recent Labs      07/23/18   1200   BC  24 Hours- no growth     Recent Labs      07/23/18   2200   BLOODCULT2  24 Hours- no growth     Recent Labs      07/23/18   1222   [address] not present     No results for input(s): CULTRESP in the last 72 hours. No results for input(s): WNDABS in the last 72 hours. Radiology :  XR CHEST PORTABLE   Final Result   No gross focal consolidation.       CT

## 2018-07-25 NOTE — PROGRESS NOTES
Subjective:  68-year-old woman seen on [facility] earlier this morning  Admitted with the picture suggestive of sepsis with lactic acidosis and leukocytosis  Responded well to IV hydration and Rocephin  Urine cultures so far negative  Mental status improved significantly, nearly close to her baseline  Tolerating medications well    Objective:    /63   Pulse 78   Temp 98.4 °F (36.9 °C) (Oral)   Resp 18   Ht 5' (1.524 m)   Wt 113 lb 8 oz (51.5 kg)   SpO2 95%   BMI 22.17 kg/m²   Awake alert oriented  Oral mucosa moist  Neck no stiffness  Heart:  RRR, no murmurs, gallops, or rubs.   Lungs:  CTA bilaterally, no wheeze, rales or rhonchi  Abd: bowel sounds present, nontender, nondistended, no masses  Extrem:  No clubbing, cyanosis, or edema  Balbuena intact draining clear urine  CBC CMP normal  Urine cultures so far no growth    Assessment:  Sepsis upon admission  Lactic acidosis and leukocytosis  UTI working diagnosis  Multiple comorbidities  Patient Active Problem List   Diagnosis    Senile osteoporosis    Generalized osteoarthritis    Coxitis    Parkinson disease (HCC)    Palpitations    Colitis    Lumbosacral spondylosis    Bursitis of right hip    Primary osteoarthritis of left knee    Memory impairment    Intractable back pain    Acute midline low back pain without sciatica    Closed compression fracture of L4 lumbar vertebra (HCC)    Closed compression fracture of thoracic vertebra (HCC)    Altered mental status    Sepsis ([address]    Respiratory distress    Congestive heart failure ([address]    UTI (urinary tract infection)    Palliative care by specialist       Plan:  ID consult in progress  Continue Rocephin  May discontinue IV fluids   prefers her to  [facility]  12:30 PM  7/25/2018

## 2018-07-25 NOTE — PLAN OF CARE
Problem: Infection:  Goal: Will remain free from infection  Will remain free from infection  Outcome: Ongoing

## 2018-07-26 NOTE — PROGRESS NOTES
ID Progress Note      Brief Interval History    Subjective: The patient is awake and alert. Tolerating medications. Reports no side effects. Afebrile. 10 ROS otherwise negative unless otherwise specified above. Objective:    Vitals:    07/26/18 1057   BP: 118/60   Pulse: 80   Resp:    Temp:    SpO2:      VENT SETTINGS:      General Appearance:    Awake, alert , no acute distress. HEENT:    Normocephalic,PERRL,neck supple, no JVD, mucosa moist, no thrush   Lungs:     Clear to auscultation bilaterally, no wheeze , crackles   Heart:    Regular rate and rhythm, no murmur   Abdomen:     Soft, non-tender, not distended  bowel sounds present,   Extremities:   No edema,no open wound,no erythema, non  tender   Skin:   no rashes or lesions     Labs:  Recent Labs      07/24/18   0525  07/25/18   0600  07/26/18   0553   WBC  12.3*  11.3  9.5   RBC  4.43  3.99  4.20   HGB  12.6  11.6  11.6   HCT  38.6  34.7  37.5   MCV  87.1  87.0  89.3   MCH  28.4  29.1  27.6   MCHC  32.6  33.4  30.9*   RDW  16.5*  16.5*  16.8*   PLT  308  282  298   MPV  10.6  10.7  10.8     CMP:    Lab Results   Component Value Date     07/26/2018    K 5.2 07/26/2018     07/26/2018    CO2 27 07/26/2018    BUN 14 07/26/2018    CREATININE 0.7 07/26/2018    GFRAA >60 07/26/2018    LABGLOM >60 07/26/2018    GLUCOSE 94 07/26/2018    PROT 5.8 07/26/2018    LABALBU 3.4 07/26/2018    CALCIUM 10.4 07/26/2018    BILITOT 0.2 07/26/2018    ALKPHOS 79 07/26/2018    AST 16 07/26/2018    ALT <5 07/26/2018          Microbiology :  Recent Labs      07/23/18   1200   BC  24 Hours- no growth     Recent Labs      07/23/18   2200   BLOODCULT2  24 Hours- no growth     Recent Labs      07/23/18   1222   [address] not present     No results for input(s): CULTRESP in the last 72 hours. No results for input(s): WNDABS in the last 72 hours. Radiology :  XR CHEST PORTABLE   Final Result   No gross focal consolidation.       CT Head WO Contrast Final Result   1. No evidence of intracranial hemorrhage, extra axial collection,   space-occupying mass lesion or mass effect, midline shift, or acute   territorial infarction. If there is strong clinical suspicion for   acute infarct of the brain, then MR imaging of the brain with   diffusion-weighted sequence may be obtained for further evaluation. 2. Chronic involutional changes and moderate microvascular ischemic   disease as described above. URINARY CATHETER OUTPUT (Balbuena):  Urethral Catheter-Output (mL): 1550 mL    Assessment and Plan:         Failure to thrive  Suspected CAUTI  Leukocytosis   Urine cultures no growth    Plan  - Patient was already started on on Augmentin 3 days prior to the admission, currently UA shows 5-10 WBCs with moderate amount of renal epithelial cells.  It's difficult to differentiate whether patient got improved because of antibiotics or with proper rehydration  - She has suffered urinary tract infections around the time of catheter exchange  - She has grown Proteus mirabilis pansensitive in the past  - Continue with ceftriaxone for now  - Okay to discharge on by mouth cefuroxime for 9 more days  - Cefuroxime to be given during Balbuena catheter exchange  - Recommended pentamidine inhalation once a month for PCP prophylaxis while she is on high dose of steroids( patient has Bactrim allergy)  -ok for D/C from ID standpoint        Electronically signed by Flor Delgado MD on 7/26/2018 at 11:13 AM

## 2018-07-26 NOTE — PLAN OF CARE
Problem: Infection, Septic Shock:  Goal: Will show no infection signs and symptoms  Will show no infection signs and symptoms   Outcome: Met This Shift      Problem: Risk for Impaired Skin Integrity  Goal: Tissue integrity - skin and mucous membranes  Structural intactness and normal physiological function of skin and  mucous membranes.    Outcome: Met This Shift      Problem: Falls - Risk of:  Goal: Will remain free from falls  Will remain free from falls   Outcome: Met This Shift    Goal: Absence of physical injury  Absence of physical injury   Outcome: Met This Shift      Problem: Infection:  Goal: Will remain free from infection  Will remain free from infection   Outcome: Met This Shift      Problem: Safety:  Goal: Free from accidental physical injury  Free from accidental physical injury   Outcome: Met This Shift    Goal: Free from intentional harm  Free from intentional harm   Outcome: Met This Shift      Problem: Pain:  Goal: Patient's pain/discomfort is manageable  Patient's pain/discomfort is manageable   Outcome: Met This Shift      Problem: Skin Integrity:  Goal: Skin integrity will stabilize  Skin integrity will stabilize   Outcome: Met This Shift      Problem: Discharge Planning:  Goal: Patients continuum of care needs are met  Patients continuum of care needs are met   Outcome: Met This Shift

## 2018-07-27 NOTE — CARE COORDINATION
[facility] Update Call    2018    Patient: Allyson Moctezuma Patient : 1945   MRN: <[id number]>  Reason for Admission:  AVS Diagnosis:  Sepsis, UTI  Discharge Date: 18 RARS: Readmission Risk Score: 23       Spoke with Claudia Bowen, , at [facility] and confirmed patient is currently at the facility. Discussed will be calling Pascual Aguiar in [address] [place] [address] for weekly patient progress updates.

## 2018-07-27 NOTE — DISCHARGE SUMMARY
Vitamins-Minerals (CENTRUM ADULTS) TABS Take 1 tablet by mouth dailyHistorical Med      bisacodyl (DULCOLAX) 10 MG suppository Place 10 mg rectally daily as needed for ConstipationHistorical Med      magnesium hydroxide (MILK OF MAGNESIA) 400 MG/5ML suspension Take 30 mLs by mouth daily as needed for ConstipationHistorical Med      !! traMADol (ULTRAM) 50 MG tablet Take 50 mg by mouth nightly. Prosper Barbosa Historical Med      !! traMADol (ULTRAM) 50 MG tablet Take 1 tablet by mouth every 6 hours as needed for Pain. ., Disp-30 tablet, R-0Print      carbidopa-levodopa (SINEMET)  MG per tablet TAKE 1 TABLET THREE TIMES A DAY, Disp-270 tablet, R-3Normal      atenolol (TENORMIN) 25 MG tablet Take 1 tablet by mouth every morning, Disp-90 tablet, R-2Normal      Calcium Carb-Cholecalciferol (CALCIUM-VITAMIN D) 500-200 MG-UNIT per tablet Take 1 tablet by mouth 2 times dailyHistorical Med      potassium chloride (KLOR-CON M) 20 MEQ extended release tablet Take 20 mEq by mouth 3 times dailyHistorical Med      predniSONE (DELTASONE) 20 MG tablet Take 20 mg by mouth dailyHistorical Med      vitamin B-12 (CYANOCOBALAMIN) 1000 MCG tablet Take 1,000 mcg by mouth dailyHistorical Med      DULoxetine (CYMBALTA) 60 MG extended release capsule Take 1 capsule by mouth daily, Disp-90 capsule, R-1Normal      amLODIPine (NORVASC) 5 MG tablet Take 5 mg by mouth dailyHistorical Med      ursodiol (ACTIGALL) 300 MG capsule Take 300 mg by mouth dailyHistorical Med      hydroxychloroquine (PLAQUENIL) 200 MG tablet Take 200 mg by mouth daily Historical Med      aspirin 325 MG EC tablet Take 325 mg by mouth daily. Historical Med      folic acid (FOLVITE) 693 MCG tablet Take 800 mcg by mouth daily. Historical Med       !! - Potential duplicate medications found. Please discuss with provider.       STOP taking these medications       amoxicillin-clavulanate (AUGMENTIN) 875-125 MG per tablet Comments:   Reason for Stopping:         Handicap Placard MISC Comments:   Reason for Stopping:         Multiple Vitamins-Minerals (MULTIVITAMIN PO) Comments:   Reason for Stopping:             Activity:as tolerated  Diet: General    Follow-up with PCP    Note that over 40 minutes was spent in preparing discharge papers, discussing discharge with patient, medication review, etc.    Signed:  Lyndon Guido MD  7/27/2018  4:58 PM

## 2018-08-10 NOTE — CARE COORDINATION
[facility] Update Call    8/10/2018    Patient: Clerance Degree Patient : 1945   MRN: <[id number]>  Reason for Admission:  AVS Diagnosis:  Sepsis, UTI  Discharge Date: 18 RARS: Readmission Risk Score: 23       Received a message from Corona Parker, , at [facility].   Lorna Vargas provided the following patient update:   -patient is ambulating 5' with walker   -maximum assist with lower body dressing and bathing   -moderate assist with lower body dressing and bathing   -maximum assist with transfers  -waiting for [employer] to present notice of medical non-coverage   -Plan is to discharge to [facility]

## 2018-08-17 PROBLEM — R19.8 PERFORATED VISCUS: Status: ACTIVE | Noted: 2018-01-01

## 2018-08-17 NOTE — ED NOTES
Dr Kalpana Narvaez and dr Jairo Alvares at bedside preparing for intubation.       Irish Bone RN  08/17/18 4426

## 2018-08-17 NOTE — ED PROVIDER NOTES
minutes    ATTENDING PROVIDER ATTESTATION:     Doroteo Godinez presented to the emergency department for evaluation of Altered Mental Status ([facility], reported by EMS that NH reports that patient has increased AMS and lethargy x 2days, HX of parkinsons and tremors are new and increased lethargy, NH reports that RR has increased as well)   and was initially evaluated by the Medical Resident. See Original ED Note for H&P and ED course above. I have reviewed and discussed the case, including pertinent history (medical, surgical, family and social) and exam findings with the Medical Resident assigned to Doroteo Godinez. I have personally performed and/or participated in the history, exam, medical decision making, and procedures and agree with all pertinent clinical information. I have reviewed my findings and recommendations with the assigned Medical Resident, Doroteo Godinez and members of family present at the time of disposition. My findings/plan: The primary encounter diagnosis was Altered mental status, unspecified altered mental status type. Diagnoses of Sepsis, due to unspecified organism [facility]), Perforated viscus, Lactic acidosis, and Respiratory failure, unspecified chronicity, unspecified whether with hypoxia or hypercapnia ([facility] [address] were also pertinent to this visit.   Current Discharge Medication List        MD Saadia George MD  08/18/18 5333

## 2018-08-17 NOTE — ED NOTES
7. 5 ETT tube inserted without glidescope by dr Juan Chowdhury at this time     Jackelin Sotelo RN  08/17/18 5282

## 2018-08-18 PROBLEM — M70.71 BURSITIS OF RIGHT HIP: Status: RESOLVED | Noted: 2017-03-14 | Resolved: 2018-01-01

## 2018-08-18 PROBLEM — R41.82 ALTERED MENTAL STATUS: Status: RESOLVED | Noted: 2018-01-01 | Resolved: 2018-01-01

## 2018-08-18 PROBLEM — J96.01 ACUTE RESPIRATORY FAILURE WITH HYPOXIA (HCC): Status: ACTIVE | Noted: 2018-01-01

## 2018-08-18 PROBLEM — M54.50 ACUTE MIDLINE LOW BACK PAIN WITHOUT SCIATICA: Status: RESOLVED | Noted: 2017-01-01 | Resolved: 2018-01-01

## 2018-08-18 PROBLEM — M54.9 INTRACTABLE BACK PAIN: Status: RESOLVED | Noted: 2017-01-01 | Resolved: 2018-01-01

## 2018-08-18 PROBLEM — N39.0 UTI (URINARY TRACT INFECTION): Status: RESOLVED | Noted: 2018-01-01 | Resolved: 2018-01-01

## 2018-08-18 PROBLEM — K52.9 COLITIS: Status: RESOLVED | Noted: 2017-03-08 | Resolved: 2018-01-01

## 2018-08-18 NOTE — H&P
Internal Medicine History & Physical (Covering Dr. Josue Garay)    The patient  prior to me seeing the patient today. Assessment  Patient Active Problem List    Diagnosis Date Noted    Perforated viscus 2018     Priority: High    Acute respiratory failure with hypoxia ([facility] [address] 2018     Priority: Medium    Severe sepsis (HCC)      Priority: Medium    Parkinson disease ([facility] [address] 2015     Priority: Medium    Congestive heart failure (HCC)     Closed compression fracture of L4 lumbar vertebra (HCC) 2017    Closed compression fracture of thoracic vertebra ([facility] [address] 2017    Memory impairment 2017    Primary osteoarthritis of left knee 2017    Lumbosacral spondylosis 2017    Senile osteoporosis 2002    Generalized osteoarthritis 2002       Plan  · Perforated viscus/hypoxic respiratory failure/severe sepsis: General surgery recommended comfort measures in the ED. Patient extubated in the emergency department. Code status DNR CC. Morphine infusion. Ativan as needed. Robinul as needed. Hospice consultation. NPO. · Please see orders for further management and care. · Discharge plan: The patient during this hospital stay. Josesito Patel DO  2018  7:42 AM    Hospital Cell (always forwarded to covering physician): [phone]. I can be reached through e[employer] as well. NOTE:  This report was transcribed using voice recognition software. Every effort was made to ensure accuracy; however, inadvertent computerized transcription errors may be present.

## 2018-08-18 NOTE — PROGRESS NOTES
Spiritual care notified of pt expiry.     Electronically signed by Ezra Toussaint RN on 8/18/2018 at 8:32 AM

## 2018-08-18 NOTE — PROGRESS NOTES
Patient's family has chosen [facility]  home in [address]. They are ready for pt to go at any time. Skin care provided.     Electronically signed by Mary Kate Curry RN on 2018 at 8:26 AM

## 2018-08-18 NOTE — PROGRESS NOTES
Family refusing repositioning at this time. Explained risks/benefits. Family verballizes understanding. Will continue to monitor.

## 2018-08-22 LAB
BLOOD CULTURE, ROUTINE: NORMAL
CULTURE, BLOOD 2: NORMAL

## 2018-08-24 NOTE — DISCHARGE SUMMARY
57-year-old woman admitted from the nursing home through the emergency room due to altered mental status and menses  She was found to have perforated viscus  Family made her DNR CC  Admitted to general floor  Comfort care was provided  She went on to

## 2020-07-13 NOTE — PROGRESS NOTES
[address] [facility]  Physical Therapy Initial Evaluation    Name: Danay Chiang  : 1945  MRN: [id number]    Date of Service: 3/20/2018    Evaluating Therapist: Cedrick France PT, DPT       Equipment Recommendations: to be decided     Room #: 9250/0704-Q  DIAGNOSIS: sepsis  PRECAUTIONS: fall risk, O2    Social:  Pt lives with  in a 1-story floor plan 3 steps to enter. Prior to admission pt walked with rollator. Initial Evaluation  Date: 3/20 Treatment      Short Term/ Long Term   Goals   Was pt agreeable to Eval/treatment? Yes     Does pt have pain? 2/10 R shoulder pain     Bed Mobility  Rolling: NT  Supine to sit: NT  Sit to supine: SBA  Scooting: dependent  SBA   Transfers Sit to stand: mod-A  Stand to sit: mod-A  Stand pivot: NT  Min-A   Ambulation    20 feet with RW with mod-A  50+ feet with AAD with min-A   Stair negotiation: ascended and descended  NT  3 steps with 1 rail with min-A   AM-PAC Raw Score 14/24       Pt is alert and Oriented x3  ROM:  L LE grossly WFL  R LE grossly WFL  Strength in sitting:   L LE grossly 4/5  R LE grossly 4/5  Balance: standing with B UE support on RW- mod-A   Sensation: no c/o of numbness/tingling  Edema: some swelling noted in R LE   Endurance: poor       ASSESSMENT  Pt displays functional ability as noted in the objective portion of this evaluation. Comments/Treatment:  Pt sitting on EOB at beginning of session. Agreeable to PT evaluation. Pt was very retropulsive upon standing and during gait and demonstrated a shuffling gait pattern. Pt returned to supine in bed with call light in reach. Patient education  Pt educated on mobility. Patient response to education:   Pt verbalized understanding Pt demonstrated skill Pt requires further education in this area     X     Rehab potential is Good for reaching above PT goals. Pts/ family goals   1. None stated.     Patient and or family understand(s) diagnosis, prognosis, and plan of Mirvaso Counseling: Mirvaso is a topical medication which can decrease superficial blood flow where applied. Side effects are uncommon and include stinging, redness and allergic reactions.

## 2024-08-17 NOTE — PROGRESS NOTES
Encounter Date: 8/17/2024       History     Chief Complaint   Patient presents with    Hypertension     X COUPLE DAYS    Back Pain     67-year-old female presents to the ER with elevated blood pressure.  Saw primary care yesterday.  Blood pressures have been elevated for several weeks 140s 150s.  Blood pressure 190 today.  Also complaining of low back pain radiating down the leg.  Recently diagnosed with sciatica.  Also mild global headache.  No visual changes no jaw claudication no temporal discomfort.  History of migraines but this feels different.  No neck pain no extremity weakness or numbness.  Patient is on irbesartan and hydrochlorothiazide.    The history is provided by the patient.     Review of patient's allergies indicates:   Allergen Reactions    Meprogesic     Propranolol      Caused increased B/P     Past Medical History:   Diagnosis Date    Hypertension     Migraine     Migraine headache     Personal history of colonic polyps     Thyroid disease      Past Surgical History:   Procedure Laterality Date    HEMORRHOID SURGERY      HYSTERECTOMY      PATENT DUCTUS ARTERIOUS LIGATION      TONSILLECTOMY       Family History   Problem Relation Name Age of Onset    Stroke Mother Jackeline     Lung cancer Father Marty     Heart attack Father Marty     Alcohol abuse Father Marty     Cancer Sister Adrienne     No Known Problems Sister      No Known Problems Sister      Kidney disease Brother Eliseo     Cancer Brother Eliseo     Heart disease Brother Eliseo     HIV Brother Eliseo     Birth defects Son Wilver     No Known Problems Son      Migraines Maternal Grandmother      Collagen disease Neg Hx      Melanoma Neg Hx      Psoriasis Neg Hx      Lupus Neg Hx      Eczema Neg Hx       Social History     Tobacco Use    Smoking status: Former     Current packs/day: 0.50     Types: Cigarettes    Smokeless tobacco: Never   Substance Use Topics    Alcohol use: Never    Drug use: No     Review of Systems   Musculoskeletal:   Subjective:  75-year-old  woman with complicated medical history  Admitted with hypoxic respiratory failure and high fever  Being treated for UTI  She feels much better this morning  Spoke with the  in the butler  Tolerating medications well  Mental status at her baseline  Objective:    BP (!) 147/86   Pulse 65   Temp 97.9 °F (36.6 °C) (Oral)   Resp 20   Ht 5' (1.524 m)   Wt 132 lb 4.8 oz (60 kg)   SpO2 94%   BMI 25.84 kg/m²    Looked somewhat pale  Awake alert and oriented  Oral mucosa moist  Neck supple  Heart:  RRR, no murmurs, gallops, or rubs.   Lungs: few scattered rales  Abd: bowel sounds present, nontender, nondistended, no masses  Extrem:  No clubbing, cyanosis, or edema  Generalized muscle atrophy symmetrical  Data reviewed    Assessment:  Acute hypoxic respiratory failure  Doubt pulmonary embolism  Doubt CHF  Suspected rheumatoid lung disease  Recurrent catheter associated UTI  Multiple comorbidities  Patient Active Problem List   Diagnosis    Senile osteoporosis    Generalized osteoarthritis    Coxitis    Parkinson disease (HCC)    Palpitations    Colitis    Lumbosacral spondylosis    Bursitis of right hip    Primary osteoarthritis of left knee    Memory impairment    Intractable back pain    Acute midline low back pain without sciatica    Closed compression fracture of L4 lumbar vertebra (HCC)    Closed compression fracture of thoracic vertebra (HCC)    Altered mental status    Sepsis ([address]    Respiratory distress    Congestive heart failure (HCC)       Plan:  CT angiogram pending  Pulmonary input appreciated  Spoke with the  at length  Continue current medical management  Workup in progress        Sergio Galvan  9:16 PM  7/6/2018 Positive for back pain.   Neurological:  Positive for headaches.       Physical Exam     Initial Vitals [08/17/24 1452]   BP Pulse Resp Temp SpO2   (!) 194/88 81 18 98 °F (36.7 °C) 99 %      MAP       --         Physical Exam    Nursing note and vitals reviewed.  Constitutional: She appears well-developed and well-nourished. She is not diaphoretic. No distress.   HENT:   Head: Normocephalic and atraumatic.   Eyes: EOM are normal.   Neck: Neck supple.   Normal range of motion.  Cardiovascular:  Normal rate, regular rhythm and normal heart sounds.     Exam reveals no gallop and no friction rub.       No murmur heard.  Pulses:       Dorsalis pedis pulses are 2+ on the right side.   Pulmonary/Chest: Breath sounds normal. No respiratory distress. She has no wheezes. She has no rhonchi. She has no rales.   Musculoskeletal:         General: Normal range of motion.      Cervical back: Normal range of motion and neck supple.      Lumbar back: Tenderness present. No bony tenderness.        Back:      Neurological: She is alert and oriented to person, place, and time.   Skin: Skin is warm and dry.   Psychiatric: She has a normal mood and affect. Her behavior is normal. Judgment and thought content normal.         ED Course   Procedures  Labs Reviewed - No data to display       Imaging Results    None          Medications   acetaminophen tablet 1,000 mg (1,000 mg Oral Given 8/17/24 1537)   ibuprofen tablet 800 mg (800 mg Oral Given 8/17/24 1537)     Medical Decision Making  1641 Patient presents to the ER with headache low back pain elevated blood pressure.  Back pain consistent with sciatica.  Good pulse in the right foot.  Seen by primary care yesterday.  Patient is on irbesartan 75 b.i.d., HCTZ once a day.  Blood pressures recently have been between 150 and 190.  Headache is improved with p.o. Motrin and Tylenol.  I do not suspect a life-threatening cause of the headache such as subarachnoid, meningitis, epidural, subdural,  venous thrombus, intracranial hypertension, carotid dissection or vertebral dissection.  No temporal artery tenderness I doubt temporal arteritis.  I have advised the patient to take hydrocodone as needed for sciatic pain and increase hydrochlorothiazide to 25 mg a day.  If this does not control blood pressure well after several days increase irbesartan to 225 mg a day.  Keep blood pressure log, follow up primary care next week at least by phone. [EF]      Risk  OTC drugs.  Prescription drug management.               ED Course as of 08/17/24 1704   Sat Aug 17, 2024   1512 BP(!): 194/88 [EF]   1512 Temp: 98 °F (36.7 °C) [EF]   1512 Temp Source: Oral [EF]   1512 Pulse: 81 [EF]   1512 Resp: 18 [EF]   1512 SpO2: 99 % [EF]   1630 BP(!): 177/76 [EF]   1641 Patient presents to the ER with headache low back pain elevated blood pressure.  Back pain consistent with sciatica.  Good pulse in the right foot.  Seen by primary care yesterday.  Patient is on irbesartan 75 b.i.d., HCTZ once a day.  Blood pressures recently have been between 150 and 190.  Headache is improved with p.o. Motrin and Tylenol.  I do not suspect a life-threatening cause of the headache such as subarachnoid, meningitis, epidural, subdural, venous thrombus, intracranial hypertension, carotid dissection or vertebral dissection.  No temporal artery tenderness I doubt temporal arteritis.  I have advised the patient to take hydrocodone as needed for sciatic pain and increase hydrochlorothiazide to 25 mg a day.  If this does not control blood pressure well after several days increase irbesartan to 225 mg a day.  Keep blood pressure log, follow up primary care next week at least by phone. [EF]      ED Course User Index  [EF] Sanchez Bowman MD                           Clinical Impression:  Final diagnoses:  [I10] Hypertension, unspecified type (Primary)          ED Disposition Condition    Discharge Stable          ED Prescriptions       Medication Sig  Dispense Start Date End Date Auth. Provider    hydroCHLOROthiazide (HYDRODIURIL) 12.5 MG Tab Take 2 tablets (25 mg total) by mouth once daily. 90 tablet 8/17/2024 -- Sanchez Bowman MD          Follow-up Information       Follow up With Specialties Details Why Contact Info Additional Information    [facility] - Emergency Dept Emergency Medicine  As needed [address] [facility] [address] [phone] 1st floor    Amado Viera III, MD Family Medicine Call in 1 week  [address]  [address]  [facility] [address]  [phone]                Sanchez Bowman MD  08/17/24 5157